# Patient Record
Sex: MALE | Race: WHITE | URBAN - METROPOLITAN AREA
[De-identification: names, ages, dates, MRNs, and addresses within clinical notes are randomized per-mention and may not be internally consistent; named-entity substitution may affect disease eponyms.]

---

## 2017-01-11 ENCOUNTER — HISTORICAL (OUTPATIENT)
Dept: ADMINISTRATIVE | Facility: HOSPITAL | Age: 82
End: 2017-01-11

## 2017-01-26 ENCOUNTER — HISTORICAL (OUTPATIENT)
Dept: ADMINISTRATIVE | Facility: HOSPITAL | Age: 82
End: 2017-01-26

## 2017-04-10 ENCOUNTER — HISTORICAL (OUTPATIENT)
Dept: ADMINISTRATIVE | Facility: HOSPITAL | Age: 82
End: 2017-04-10

## 2017-07-12 ENCOUNTER — HISTORICAL (OUTPATIENT)
Dept: LAB | Facility: HOSPITAL | Age: 82
End: 2017-07-12

## 2017-08-07 ENCOUNTER — HISTORICAL (OUTPATIENT)
Dept: ADMINISTRATIVE | Facility: HOSPITAL | Age: 82
End: 2017-08-07

## 2017-08-07 LAB
ALBUMIN SERPL-MCNC: 3.6 GM/DL (ref 3.4–5)
ALBUMIN/GLOB SERPL: 0.9 RATIO (ref 1.1–2)
ALP SERPL-CCNC: 55 UNIT/L (ref 50–136)
ALT SERPL-CCNC: 21 UNIT/L (ref 12–78)
AST SERPL-CCNC: 21 UNIT/L (ref 15–37)
BILIRUB SERPL-MCNC: 0.5 MG/DL (ref 0.2–1)
BILIRUBIN DIRECT+TOT PNL SERPL-MCNC: 0.1 MG/DL (ref 0–0.5)
BILIRUBIN DIRECT+TOT PNL SERPL-MCNC: 0.4 MG/DL (ref 0–0.8)
BUN SERPL-MCNC: 22 MG/DL (ref 7–18)
CALCIUM SERPL-MCNC: 8.8 MG/DL (ref 8.5–10.1)
CHLORIDE SERPL-SCNC: 105 MMOL/L (ref 98–107)
CHOLEST SERPL-MCNC: 160 MG/DL (ref 0–200)
CHOLEST/HDLC SERPL: 3.3 {RATIO} (ref 0–5)
CO2 SERPL-SCNC: 26 MMOL/L (ref 21–32)
CREAT SERPL-MCNC: 1.53 MG/DL (ref 0.7–1.3)
DEPRECATED CALCIDIOL+CALCIFEROL SERPL-MC: 48.4 NG/ML (ref 30–80)
ERYTHROCYTE [DISTWIDTH] IN BLOOD BY AUTOMATED COUNT: 12.8 % (ref 11.5–17)
GLOBULIN SER-MCNC: 3.9 GM/DL (ref 2.4–3.5)
GLUCOSE SERPL-MCNC: 108 MG/DL (ref 74–106)
HCT VFR BLD AUTO: 34.3 % (ref 42–52)
HDLC SERPL-MCNC: 49 MG/DL (ref 35–60)
HGB BLD-MCNC: 11 GM/DL (ref 14–18)
LDLC SERPL CALC-MCNC: 81 MG/DL (ref 0–129)
MCH RBC QN AUTO: 28.5 PG (ref 27–31)
MCHC RBC AUTO-ENTMCNC: 32.1 GM/DL (ref 33–36)
MCV RBC AUTO: 88.9 FL (ref 80–94)
PLATELET # BLD AUTO: 180 X10(3)/MCL (ref 130–400)
PMV BLD AUTO: 9.1 FL (ref 9.4–12.4)
POTASSIUM SERPL-SCNC: 4.8 MMOL/L (ref 3.5–5.1)
PROT SERPL-MCNC: 7.5 GM/DL (ref 6.4–8.2)
PSA SERPL-MCNC: 0.4 NG/ML (ref 0–4)
RBC # BLD AUTO: 3.86 X10(6)/MCL (ref 4.7–6.1)
SODIUM SERPL-SCNC: 141 MMOL/L (ref 136–145)
TRIGL SERPL-MCNC: 149 MG/DL (ref 30–150)
VLDLC SERPL CALC-MCNC: 30 MG/DL
WBC # SPEC AUTO: 6.6 X10(3)/MCL (ref 4.5–11.5)

## 2017-10-02 ENCOUNTER — HISTORICAL (OUTPATIENT)
Dept: ADMINISTRATIVE | Facility: HOSPITAL | Age: 82
End: 2017-10-02

## 2017-10-02 LAB
EST. AVERAGE GLUCOSE BLD GHB EST-MCNC: 131 MG/DL
HBA1C MFR BLD: 6.2 % (ref 4.2–6.3)
T4 SERPL-MCNC: 10.7 MCG/DL (ref 4.7–13.3)
TSH SERPL-ACNC: 1.02 MIU/ML (ref 0.36–3.74)

## 2017-12-21 ENCOUNTER — HISTORICAL (OUTPATIENT)
Dept: ADMINISTRATIVE | Facility: HOSPITAL | Age: 82
End: 2017-12-21

## 2017-12-21 LAB
FLUAV AG NPH QL IA: POSITIVE
FLUBV AG NPH QL IA: NEGATIVE

## 2018-01-08 ENCOUNTER — HISTORICAL (OUTPATIENT)
Dept: ADMINISTRATIVE | Facility: HOSPITAL | Age: 83
End: 2018-01-08

## 2018-01-08 LAB
EST. AVERAGE GLUCOSE BLD GHB EST-MCNC: 126 MG/DL
HBA1C MFR BLD: 6 % (ref 4.2–6.3)

## 2018-04-06 ENCOUNTER — HISTORICAL (OUTPATIENT)
Dept: ADMINISTRATIVE | Facility: HOSPITAL | Age: 83
End: 2018-04-06

## 2018-04-06 LAB
EST. AVERAGE GLUCOSE BLD GHB EST-MCNC: 131 MG/DL
HBA1C MFR BLD: 6.2 % (ref 4.2–6.3)
T4 SERPL-MCNC: 13 MCG/DL (ref 4.7–13.3)
TSH SERPL-ACNC: 3.6 MIU/ML (ref 0.36–3.74)

## 2018-07-02 ENCOUNTER — HISTORICAL (OUTPATIENT)
Dept: ADMINISTRATIVE | Facility: HOSPITAL | Age: 83
End: 2018-07-02

## 2018-07-02 LAB
EST. AVERAGE GLUCOSE BLD GHB EST-MCNC: 126 MG/DL
HBA1C MFR BLD: 6 % (ref 4.2–6.3)

## 2018-07-06 ENCOUNTER — HISTORICAL (OUTPATIENT)
Dept: ADMINISTRATIVE | Facility: HOSPITAL | Age: 83
End: 2018-07-06

## 2018-07-06 LAB
ALBUMIN SERPL-MCNC: 3.7 GM/DL (ref 3.4–5)
ALBUMIN/GLOB SERPL: 0.9 {RATIO}
ALP SERPL-CCNC: 52 UNIT/L (ref 50–136)
ALT SERPL-CCNC: 20 UNIT/L (ref 12–78)
AST SERPL-CCNC: 22 UNIT/L (ref 15–37)
BILIRUB SERPL-MCNC: 0.5 MG/DL (ref 0.2–1)
BILIRUBIN DIRECT+TOT PNL SERPL-MCNC: 0.2 MG/DL (ref 0–0.2)
BILIRUBIN DIRECT+TOT PNL SERPL-MCNC: 0.3 MG/DL (ref 0–0.8)
BUN SERPL-MCNC: 34 MG/DL (ref 7–18)
CALCIUM SERPL-MCNC: 8.6 MG/DL (ref 8.5–10.1)
CHLORIDE SERPL-SCNC: 102 MMOL/L (ref 98–107)
CHOLEST SERPL-MCNC: 143 MG/DL (ref 0–200)
CHOLEST/HDLC SERPL: 3.1 {RATIO} (ref 0–5)
CO2 SERPL-SCNC: 28 MMOL/L (ref 21–32)
CREAT SERPL-MCNC: 1.74 MG/DL (ref 0.7–1.3)
DEPRECATED CALCIDIOL+CALCIFEROL SERPL-MC: 51 NG/ML (ref 30–80)
ERYTHROCYTE [DISTWIDTH] IN BLOOD BY AUTOMATED COUNT: 13.2 % (ref 11.5–17)
GLOBULIN SER-MCNC: 3.9 GM/DL (ref 2.4–3.5)
GLUCOSE SERPL-MCNC: 93 MG/DL (ref 74–106)
HCT VFR BLD AUTO: 33.2 % (ref 42–52)
HDLC SERPL-MCNC: 46 MG/DL (ref 35–60)
HGB BLD-MCNC: 9.9 GM/DL (ref 14–18)
LDLC SERPL CALC-MCNC: 78 MG/DL (ref 0–129)
MCH RBC QN AUTO: 25.2 PG (ref 27–31)
MCHC RBC AUTO-ENTMCNC: 29.8 GM/DL (ref 33–36)
MCV RBC AUTO: 84.5 FL (ref 80–94)
PLATELET # BLD AUTO: 199 X10(3)/MCL (ref 130–400)
PMV BLD AUTO: 8.8 FL (ref 9.4–12.4)
POTASSIUM SERPL-SCNC: 4.9 MMOL/L (ref 3.5–5.1)
PROT SERPL-MCNC: 7.6 GM/DL (ref 6.4–8.2)
PSA SERPL-MCNC: 0.39 NG/ML (ref 0–4)
RBC # BLD AUTO: 3.93 X10(6)/MCL (ref 4.7–6.1)
SODIUM SERPL-SCNC: 138 MMOL/L (ref 136–145)
T4 SERPL-MCNC: 12 MCG/DL (ref 4.7–13.3)
TRIGL SERPL-MCNC: 94 MG/DL (ref 30–150)
TSH SERPL-ACNC: 2.5 MIU/L (ref 0.36–3.74)
VLDLC SERPL CALC-MCNC: 19 MG/DL
WBC # SPEC AUTO: 6.3 X10(3)/MCL (ref 4.5–11.5)

## 2018-08-31 ENCOUNTER — HISTORICAL (OUTPATIENT)
Dept: ADMINISTRATIVE | Facility: HOSPITAL | Age: 83
End: 2018-08-31

## 2018-08-31 LAB
ABS NEUT (OLG): 3.7 X10(3)/MCL (ref 2.1–9.2)
BASOPHILS # BLD AUTO: 0 X10(3)/MCL (ref 0–0.2)
BASOPHILS NFR BLD AUTO: 1 %
BUN SERPL-MCNC: 28 MG/DL (ref 7–18)
CALCIUM SERPL-MCNC: 8.6 MG/DL (ref 8.5–10.1)
CHLORIDE SERPL-SCNC: 104 MMOL/L (ref 98–107)
CO2 SERPL-SCNC: 26 MMOL/L (ref 21–32)
CREAT SERPL-MCNC: 1.79 MG/DL (ref 0.7–1.3)
CREAT/UREA NIT SERPL: 15.6
EOSINOPHIL # BLD AUTO: 1.3 X10(3)/MCL (ref 0–0.9)
EOSINOPHIL NFR BLD AUTO: 19 %
ERYTHROCYTE [DISTWIDTH] IN BLOOD BY AUTOMATED COUNT: 13.6 % (ref 11.5–17)
GLUCOSE SERPL-MCNC: 148 MG/DL (ref 74–106)
HCT VFR BLD AUTO: 32.1 % (ref 42–52)
HGB BLD-MCNC: 9.6 GM/DL (ref 14–18)
LYMPHOCYTES # BLD AUTO: 1.4 X10(3)/MCL (ref 0.6–4.6)
LYMPHOCYTES NFR BLD AUTO: 20 %
MCH RBC QN AUTO: 25.5 PG (ref 27–31)
MCHC RBC AUTO-ENTMCNC: 29.9 GM/DL (ref 33–36)
MCV RBC AUTO: 85.1 FL (ref 80–94)
MONOCYTES # BLD AUTO: 0.6 X10(3)/MCL (ref 0.1–1.3)
MONOCYTES NFR BLD AUTO: 8 %
NEUTROPHILS # BLD AUTO: 3.7 X10(3)/MCL (ref 1.4–7.9)
NEUTROPHILS NFR BLD AUTO: 53 %
PLATELET # BLD AUTO: 213 X10(3)/MCL (ref 130–400)
PMV BLD AUTO: 8.8 FL (ref 9.4–12.4)
POTASSIUM SERPL-SCNC: 4.5 MMOL/L (ref 3.5–5.1)
RBC # BLD AUTO: 3.77 X10(6)/MCL (ref 4.7–6.1)
SODIUM SERPL-SCNC: 137 MMOL/L (ref 136–145)
WBC # SPEC AUTO: 7 X10(3)/MCL (ref 4.5–11.5)

## 2018-09-14 ENCOUNTER — HISTORICAL (OUTPATIENT)
Dept: ADMINISTRATIVE | Facility: HOSPITAL | Age: 83
End: 2018-09-14

## 2018-09-14 LAB
BUN SERPL-MCNC: 31 MG/DL (ref 7–18)
CALCIUM SERPL-MCNC: 8.6 MG/DL (ref 8.5–10.1)
CHLORIDE SERPL-SCNC: 102 MMOL/L (ref 98–107)
CO2 SERPL-SCNC: 29 MMOL/L (ref 21–32)
CREAT SERPL-MCNC: 1.92 MG/DL (ref 0.7–1.3)
CREAT/UREA NIT SERPL: 16.1
ERYTHROCYTE [DISTWIDTH] IN BLOOD BY AUTOMATED COUNT: 13.5 % (ref 11.5–17)
GLUCOSE SERPL-MCNC: 143 MG/DL (ref 74–106)
HCT VFR BLD AUTO: 32.8 % (ref 42–52)
HGB BLD-MCNC: 9.8 GM/DL (ref 14–18)
MCH RBC QN AUTO: 25.5 PG (ref 27–31)
MCHC RBC AUTO-ENTMCNC: 29.9 GM/DL (ref 33–36)
MCV RBC AUTO: 85.4 FL (ref 80–94)
PLATELET # BLD AUTO: 218 X10(3)/MCL (ref 130–400)
PMV BLD AUTO: 8.9 FL (ref 9.4–12.4)
POTASSIUM SERPL-SCNC: 4.8 MMOL/L (ref 3.5–5.1)
RBC # BLD AUTO: 3.84 X10(6)/MCL (ref 4.7–6.1)
SODIUM SERPL-SCNC: 138 MMOL/L (ref 136–145)
WBC # SPEC AUTO: 5.3 X10(3)/MCL (ref 4.5–11.5)

## 2018-10-01 ENCOUNTER — HISTORICAL (OUTPATIENT)
Dept: ADMINISTRATIVE | Facility: HOSPITAL | Age: 83
End: 2018-10-01

## 2018-10-01 LAB
EST. AVERAGE GLUCOSE BLD GHB EST-MCNC: 140 MG/DL
HBA1C MFR BLD: 6.5 % (ref 4.2–6.3)

## 2018-10-29 ENCOUNTER — HISTORICAL (OUTPATIENT)
Dept: ADMINISTRATIVE | Facility: HOSPITAL | Age: 83
End: 2018-10-29

## 2018-11-15 ENCOUNTER — HISTORICAL (OUTPATIENT)
Dept: ADMINISTRATIVE | Facility: HOSPITAL | Age: 83
End: 2018-11-15

## 2018-11-15 LAB
ABS NEUT (OLG): 5.15 X10(3)/MCL (ref 2.1–9.2)
ALBUMIN SERPL-MCNC: 3.6 GM/DL (ref 3.4–5)
ALBUMIN/GLOB SERPL: 1 {RATIO}
ALP SERPL-CCNC: 63 UNIT/L (ref 45–117)
ALT SERPL-CCNC: 21 UNIT/L (ref 16–61)
AST SERPL-CCNC: 26 UNIT/L (ref 15–37)
BASOPHILS # BLD AUTO: 0.03 X10(3)/MCL (ref 0–0.2)
BASOPHILS NFR BLD AUTO: 0.4 % (ref 0–0.9)
BILIRUB SERPL-MCNC: 0.2 MG/DL (ref 0.2–1)
BILIRUBIN DIRECT+TOT PNL SERPL-MCNC: <0.1 MG/DL (ref 0–0.2)
BILIRUBIN DIRECT+TOT PNL SERPL-MCNC: >0.1 MG/DL (ref 0–1)
BUN SERPL-MCNC: 36 MG/DL (ref 7–18)
CALCIUM SERPL-MCNC: 8.6 MG/DL (ref 8.5–10.1)
CHLORIDE SERPL-SCNC: 105 MMOL/L (ref 98–107)
CO2 SERPL-SCNC: 27 MMOL/L (ref 21–32)
CREAT SERPL-MCNC: 1.79 MG/DL (ref 0.7–1.3)
EOSINOPHIL # BLD AUTO: 0.65 X10(3)/MCL (ref 0–0.9)
EOSINOPHIL NFR BLD AUTO: 8.8 % (ref 0–6.5)
ERYTHROCYTE [DISTWIDTH] IN BLOOD BY AUTOMATED COUNT: 13.4 % (ref 11.5–17)
GLOBULIN SER-MCNC: 4 GM/DL (ref 2–4)
GLUCOSE SERPL-MCNC: 114 MG/DL (ref 74–106)
HCT VFR BLD AUTO: 31.2 % (ref 42–52)
HGB BLD-MCNC: 9.7 GM/DL (ref 14–18)
IMM GRANULOCYTES # BLD AUTO: 0.03 10*3/UL (ref 0–0.02)
IMM GRANULOCYTES NFR BLD AUTO: 0.4 % (ref 0–0.43)
LYMPHOCYTES # BLD AUTO: 1.03 X10(3)/MCL (ref 0.6–4.6)
LYMPHOCYTES NFR BLD AUTO: 14 % (ref 16.2–38.3)
MCH RBC QN AUTO: 26.1 PG (ref 27–31)
MCHC RBC AUTO-ENTMCNC: 31.1 GM/DL (ref 33–36)
MCV RBC AUTO: 84.1 FL (ref 80–94)
MONOCYTES # BLD AUTO: 0.49 X10(3)/MCL (ref 0.1–1.3)
MONOCYTES NFR BLD AUTO: 6.6 % (ref 4.7–11.3)
NEUTROPHILS # BLD AUTO: 5.15 X10(3)/MCL (ref 2.1–9.2)
NEUTROPHILS NFR BLD AUTO: 69.8 % (ref 49.1–73.4)
NRBC BLD AUTO-RTO: 0 % (ref 0–0.2)
PLATELET # BLD AUTO: 188 X10(3)/MCL (ref 130–400)
PMV BLD AUTO: 9.4 FL (ref 7.4–10.4)
POTASSIUM SERPL-SCNC: 5.1 MMOL/L (ref 3.5–5.1)
PROT SERPL-MCNC: 8 GM/DL (ref 6.4–8.2)
RBC # BLD AUTO: 3.71 X10(6)/MCL (ref 4.7–6.1)
SODIUM SERPL-SCNC: 139 MMOL/L (ref 136–145)
WBC # SPEC AUTO: 7.4 X10(3)/MCL (ref 4.5–11.5)

## 2019-01-04 ENCOUNTER — HISTORICAL (OUTPATIENT)
Dept: ADMINISTRATIVE | Facility: HOSPITAL | Age: 84
End: 2019-01-04

## 2019-01-04 LAB
EST. AVERAGE GLUCOSE BLD GHB EST-MCNC: 128 MG/DL
HBA1C MFR BLD: 6.1 % (ref 4.2–6.3)
T4 SERPL-MCNC: 6.9 MCG/DL (ref 4.7–13.3)
TSH SERPL-ACNC: 2.77 MIU/ML (ref 0.36–3.74)

## 2019-04-03 ENCOUNTER — HISTORICAL (OUTPATIENT)
Dept: ADMINISTRATIVE | Facility: HOSPITAL | Age: 84
End: 2019-04-03

## 2019-04-03 LAB
EST. AVERAGE GLUCOSE BLD GHB EST-MCNC: 128 MG/DL
HBA1C MFR BLD: 6.1 % (ref 4.2–6.3)

## 2019-07-01 ENCOUNTER — HISTORICAL (OUTPATIENT)
Dept: ADMINISTRATIVE | Facility: HOSPITAL | Age: 84
End: 2019-07-01

## 2019-07-01 LAB
ABS NEUT (OLG): 2.39 X10(3)/MCL (ref 2.1–9.2)
ALBUMIN SERPL-MCNC: 3.3 GM/DL (ref 3.4–5)
ALBUMIN/GLOB SERPL: 0.8 RATIO (ref 1–2)
ALP SERPL-CCNC: 53 UNIT/L (ref 45–117)
ALT SERPL-CCNC: 16 UNIT/L (ref 16–61)
AST SERPL-CCNC: 16 UNIT/L (ref 15–37)
BASOPHILS NFR BLD MANUAL: 0 %
BILIRUB SERPL-MCNC: 0.2 MG/DL (ref 0.2–1)
BILIRUBIN DIRECT+TOT PNL SERPL-MCNC: <0.1 MG/DL (ref 0–0.2)
BILIRUBIN DIRECT+TOT PNL SERPL-MCNC: >0.1 MG/DL (ref 0–1)
BUN SERPL-MCNC: 34 MG/DL (ref 7–18)
CALCIUM SERPL-MCNC: 8.3 MG/DL (ref 8.5–10.1)
CHLORIDE SERPL-SCNC: 107 MMOL/L (ref 98–107)
CHOLEST SERPL-MCNC: 148 MG/DL (ref 0–199)
CHOLEST/HDLC SERPL: 3 MG/DL (ref 0–8)
CO2 SERPL-SCNC: 27 MMOL/L (ref 21–32)
CREAT SERPL-MCNC: 1.44 MG/DL (ref 0.7–1.3)
DEPRECATED CALCIDIOL+CALCIFEROL SERPL-MC: 30.11 NG/ML (ref 30–80)
EOSINOPHIL NFR BLD MANUAL: 16 % (ref 0–5)
ERYTHROCYTE [DISTWIDTH] IN BLOOD BY AUTOMATED COUNT: 13.8 % (ref 11.5–17)
EST. AVERAGE GLUCOSE BLD GHB EST-MCNC: 143 MG/DL
GLOBULIN SER-MCNC: 4 GM/DL (ref 2–4)
GLUCOSE SERPL-MCNC: 82 MG/DL (ref 74–106)
GRANULOCYTES NFR BLD MANUAL: 55 %
HBA1C MFR BLD: 6.6 % (ref 4.2–6.3)
HCT VFR BLD AUTO: 29.2 % (ref 42–52)
HDLC SERPL-MCNC: 44 MG/DL
HGB BLD-MCNC: 9.3 GM/DL (ref 14–18)
HYPOCHROMIA BLD QL SMEAR: ABNORMAL
LDLC SERPL CALC-MCNC: 86 MG/DL (ref 0–129)
LYMPHOCYTES NFR BLD MANUAL: 22 % (ref 24–44)
MCH RBC QN AUTO: 26.6 PG (ref 27–31)
MCHC RBC AUTO-ENTMCNC: 31.8 GM/DL (ref 33–36)
MCV RBC AUTO: 83.4 FL (ref 80–94)
MONOCYTES NFR BLD MANUAL: 7 % (ref 4–8)
PLATELET # BLD AUTO: 177 X10(3)/MCL (ref 130–400)
PLATELET # BLD EST: ADEQUATE 10*3/UL
PMV BLD AUTO: 9.2 FL (ref 7.4–10.4)
POTASSIUM SERPL-SCNC: 4.7 MMOL/L (ref 3.5–5.1)
PROT SERPL-MCNC: 6.8 GM/DL (ref 6.4–8.2)
PSA SERPL-MCNC: 0.3 NG/ML (ref 0–4)
RBC # BLD AUTO: 3.5 X10(6)/MCL (ref 4.7–6.1)
RBC MORPH BLD: ABNORMAL
SODIUM SERPL-SCNC: 139 MMOL/L (ref 136–145)
T4 SERPL-MCNC: 6.2 MCG/DL (ref 4.7–13.3)
TRIGL SERPL-MCNC: 92 MG/DL (ref 0–149)
TSH SERPL-ACNC: 2.1 MIU/ML (ref 0.36–3.74)
VLDLC SERPL CALC-MCNC: 18 MG/DL
WBC # SPEC AUTO: 4.6 X10(3)/MCL (ref 4.5–11.5)

## 2019-08-07 ENCOUNTER — HOSPITAL ENCOUNTER (OUTPATIENT)
Dept: MEDSURG UNIT | Facility: HOSPITAL | Age: 84
End: 2019-08-09
Attending: INTERNAL MEDICINE | Admitting: INTERNAL MEDICINE

## 2019-08-07 LAB
EST. AVERAGE GLUCOSE BLD GHB EST-MCNC: 131 MG/DL
HBA1C MFR BLD: 6.2 % (ref 4.2–6.3)

## 2019-08-08 LAB
ABS NEUT (OLG): 2.62 X10(3)/MCL (ref 2.1–9.2)
ALBUMIN SERPL-MCNC: 3.2 GM/DL (ref 3.4–5)
ALBUMIN/GLOB SERPL: 0.9 RATIO (ref 1.1–2)
ALP SERPL-CCNC: 60 UNIT/L (ref 45–117)
ALT SERPL-CCNC: 14 UNIT/L (ref 12–78)
AST SERPL-CCNC: 16 UNIT/L (ref 15–37)
BASOPHILS # BLD AUTO: 0.02 X10(3)/MCL
BASOPHILS NFR BLD AUTO: 0 %
BILIRUB SERPL-MCNC: 0.3 MG/DL (ref 0.2–1)
BILIRUBIN DIRECT+TOT PNL SERPL-MCNC: 0.1 MG/DL
BILIRUBIN DIRECT+TOT PNL SERPL-MCNC: 0.2 MG/DL
BUN SERPL-MCNC: 32 MG/DL (ref 7–18)
CALCIUM SERPL-MCNC: 8.5 MG/DL (ref 8.5–10.1)
CHLORIDE SERPL-SCNC: 109 MMOL/L (ref 98–107)
CO2 SERPL-SCNC: 29 MMOL/L (ref 21–32)
CREAT SERPL-MCNC: 1.6 MG/DL (ref 0.6–1.3)
EOSINOPHIL # BLD AUTO: 0.73 X10(3)/MCL
EOSINOPHIL NFR BLD AUTO: 15 %
ERYTHROCYTE [DISTWIDTH] IN BLOOD BY AUTOMATED COUNT: 13.4 % (ref 11.5–14.5)
GLOBULIN SER-MCNC: 3.6 GM/ML (ref 2.3–3.5)
GLUCOSE SERPL-MCNC: 85 MG/DL (ref 74–106)
HCT VFR BLD AUTO: 30.2 % (ref 40–51)
HGB BLD-MCNC: 9.3 GM/DL (ref 13.5–17.5)
LYMPHOCYTES # BLD AUTO: 0.99 X10(3)/MCL
LYMPHOCYTES NFR BLD AUTO: 21 % (ref 13–40)
MCH RBC QN AUTO: 26.4 PG (ref 26–34)
MCHC RBC AUTO-ENTMCNC: 30.8 GM/DL (ref 31–37)
MCV RBC AUTO: 85.8 FL (ref 80–100)
MONOCYTES # BLD AUTO: 0.42 X10(3)/MCL
MONOCYTES NFR BLD AUTO: 9 % (ref 0–24)
NEUTROPHILS # BLD AUTO: 2.62 X10(3)/MCL
NEUTROPHILS NFR BLD AUTO: 55 X10(3)/MCL
PLATELET # BLD AUTO: 169 X10(3)/MCL (ref 130–400)
PMV BLD AUTO: 9.1 FL (ref 7.4–10.4)
POTASSIUM SERPL-SCNC: 4.5 MMOL/L (ref 3.5–5.1)
PROT SERPL-MCNC: 6.8 GM/DL (ref 6.4–8.2)
RBC # BLD AUTO: 3.52 X10(6)/MCL (ref 4.5–5.9)
SODIUM SERPL-SCNC: 142 MMOL/L (ref 136–145)
WBC # SPEC AUTO: 4.8 X10(3)/MCL (ref 4.5–11)

## 2019-09-05 LAB — FINAL CULTURE: NORMAL

## 2019-09-30 ENCOUNTER — HOSPITAL ENCOUNTER (OUTPATIENT)
Dept: EMERGENCY MEDICINE | Facility: HOSPITAL | Age: 84
End: 2019-10-01
Attending: INTERNAL MEDICINE | Admitting: INTERNAL MEDICINE

## 2019-09-30 ENCOUNTER — HISTORICAL (OUTPATIENT)
Dept: ADMINISTRATIVE | Facility: HOSPITAL | Age: 84
End: 2019-09-30

## 2019-09-30 LAB
ABS NEUT (OLG): 2.6 X10(3)/MCL (ref 2.1–9.2)
ABS NEUT (OLG): 3.31 X10(3)/MCL (ref 2.1–9.2)
ALBUMIN SERPL-MCNC: 3.4 GM/DL (ref 3.4–5)
ALBUMIN SERPL-MCNC: 3.7 GM/DL (ref 3.4–5)
ALBUMIN/GLOB SERPL: 0.8 RATIO (ref 1.1–2)
ALBUMIN/GLOB SERPL: 0.9 RATIO (ref 1.1–2)
ALP SERPL-CCNC: 59 UNIT/L (ref 45–117)
ALP SERPL-CCNC: 61 UNIT/L (ref 45–117)
ALT SERPL-CCNC: 17 UNIT/L (ref 12–78)
ALT SERPL-CCNC: 20 UNIT/L (ref 12–78)
AMYLASE SERPL-CCNC: 99 UNIT/L (ref 25–115)
APPEARANCE, UA: CLEAR
AST SERPL-CCNC: 16 UNIT/L (ref 15–37)
AST SERPL-CCNC: 18 UNIT/L (ref 15–37)
BACTERIA #/AREA URNS AUTO: ABNORMAL /[HPF]
BASOPHILS # BLD AUTO: 0 X10(3)/MCL (ref 0–0.2)
BASOPHILS # BLD AUTO: 0 X10(3)/MCL (ref 0–0.2)
BASOPHILS NFR BLD AUTO: 0 %
BASOPHILS NFR BLD AUTO: 1 %
BILIRUB SERPL-MCNC: 0.2 MG/DL (ref 0.2–1)
BILIRUB SERPL-MCNC: 0.4 MG/DL (ref 0.2–1)
BILIRUB UR QL STRIP: NEGATIVE
BILIRUBIN DIRECT+TOT PNL SERPL-MCNC: 0.1 MG/DL (ref 0–0.2)
BILIRUBIN DIRECT+TOT PNL SERPL-MCNC: 0.3 MG/DL
BILIRUBIN DIRECT+TOT PNL SERPL-MCNC: <0.1 MG/DL (ref 0–0.2)
BILIRUBIN DIRECT+TOT PNL SERPL-MCNC: ABNORMAL MG/DL
BUN SERPL-MCNC: 40 MG/DL (ref 7–18)
BUN SERPL-MCNC: 45 MG/DL (ref 7–18)
CALCIUM SERPL-MCNC: 8.5 MG/DL (ref 8.5–10.1)
CALCIUM SERPL-MCNC: 9 MG/DL (ref 8.5–10.1)
CHLORIDE SERPL-SCNC: 109 MMOL/L (ref 98–107)
CHLORIDE SERPL-SCNC: 110 MMOL/L (ref 98–107)
CO2 SERPL-SCNC: 26 MMOL/L (ref 21–32)
CO2 SERPL-SCNC: 28 MMOL/L (ref 21–32)
COLOR UR: YELLOW
CREAT SERPL-MCNC: 2.5 MG/DL (ref 0.6–1.3)
CREAT SERPL-MCNC: 2.5 MG/DL (ref 0.6–1.3)
CREAT UR-MCNC: 353 MG/DL
EOSINOPHIL # BLD AUTO: 0.7 X10(3)/MCL (ref 0–0.9)
EOSINOPHIL # BLD AUTO: 0.9 X10(3)/MCL (ref 0–0.9)
EOSINOPHIL NFR BLD AUTO: 14 %
EOSINOPHIL NFR BLD AUTO: 16 %
ERYTHROCYTE [DISTWIDTH] IN BLOOD BY AUTOMATED COUNT: 13.6 % (ref 11.5–14.5)
ERYTHROCYTE [DISTWIDTH] IN BLOOD BY AUTOMATED COUNT: 13.7 % (ref 11.5–14.5)
FESODIUM % (OHS): 1 %
GLOBULIN SER-MCNC: 4.1 GM/ML (ref 2.3–3.5)
GLOBULIN SER-MCNC: 4.2 GM/ML (ref 2.3–3.5)
GLUCOSE (UA): NORMAL
GLUCOSE SERPL-MCNC: 103 MG/DL (ref 74–106)
GLUCOSE SERPL-MCNC: 118 MG/DL (ref 74–106)
HCT VFR BLD AUTO: 30.6 % (ref 40–51)
HCT VFR BLD AUTO: 34.3 % (ref 40–51)
HGB BLD-MCNC: 10.5 GM/DL (ref 13.5–17.5)
HGB BLD-MCNC: 9.4 GM/DL (ref 13.5–17.5)
HGB UR QL STRIP: NEGATIVE
HYALINE CASTS #/AREA URNS LPF: ABNORMAL /[LPF]
IMM GRANULOCYTES # BLD AUTO: 0.01 10*3/UL
IMM GRANULOCYTES # BLD AUTO: 0.03 10*3/UL
IMM GRANULOCYTES NFR BLD AUTO: 0 %
IMM GRANULOCYTES NFR BLD AUTO: 1 %
KETONES UR QL STRIP: ABNORMAL
LACTATE SERPL-SCNC: 0.9 MMOL/L (ref 0.4–2)
LEUKOCYTE ESTERASE UR QL STRIP: NEGATIVE
LIPASE SERPL-CCNC: 224 UNIT/L (ref 73–393)
LYMPHOCYTES # BLD AUTO: 1 X10(3)/MCL (ref 0.6–4.6)
LYMPHOCYTES # BLD AUTO: 1.1 X10(3)/MCL (ref 0.6–4.6)
LYMPHOCYTES NFR BLD AUTO: 17 %
LYMPHOCYTES NFR BLD AUTO: 22 %
MCH RBC QN AUTO: 27.2 PG (ref 26–34)
MCH RBC QN AUTO: 27.3 PG (ref 26–34)
MCHC RBC AUTO-ENTMCNC: 30.6 GM/DL (ref 31–37)
MCHC RBC AUTO-ENTMCNC: 30.7 GM/DL (ref 31–37)
MCV RBC AUTO: 88.9 FL (ref 80–100)
MCV RBC AUTO: 89 FL (ref 80–100)
MONOCYTES # BLD AUTO: 0.4 X10(3)/MCL (ref 0.1–1.3)
MONOCYTES # BLD AUTO: 0.6 X10(3)/MCL (ref 0.1–1.3)
MONOCYTES NFR BLD AUTO: 10 %
MONOCYTES NFR BLD AUTO: 9 %
NEUTROPHILS # BLD AUTO: 2.6 X10(3)/MCL (ref 2.1–9.2)
NEUTROPHILS # BLD AUTO: 3.31 X10(3)/MCL (ref 2.1–9.2)
NEUTROPHILS NFR BLD AUTO: 54 %
NEUTROPHILS NFR BLD AUTO: 56 %
NITRITE UR QL STRIP: NEGATIVE
PH UR STRIP: 5.5 [PH] (ref 4.5–8)
PLATELET # BLD AUTO: 200 X10(3)/MCL (ref 130–400)
PLATELET # BLD AUTO: 265 X10(3)/MCL (ref 130–400)
PMV BLD AUTO: 9.4 FL (ref 7.4–10.4)
PMV BLD AUTO: 9.5 FL (ref 7.4–10.4)
POTASSIUM SERPL-SCNC: 5 MMOL/L (ref 3.5–5.1)
POTASSIUM SERPL-SCNC: 5.5 MMOL/L (ref 3.5–5.1)
PROT SERPL-MCNC: 7.5 GM/DL (ref 6.4–8.2)
PROT SERPL-MCNC: 7.9 GM/DL (ref 6.4–8.2)
PROT UR QL STRIP: 30 MG/DL
RBC # BLD AUTO: 3.44 X10(6)/MCL (ref 4.5–5.9)
RBC # BLD AUTO: 3.86 X10(6)/MCL (ref 4.5–5.9)
RBC #/AREA URNS AUTO: ABNORMAL /[HPF]
SODIUM SERPL-SCNC: 141 MMOL/L (ref 136–145)
SODIUM SERPL-SCNC: 141 MMOL/L (ref 136–145)
SODIUM UR-SCNC: 102 MMOL/L
SP GR UR STRIP: 1.03 (ref 1–1.03)
SQUAMOUS #/AREA URNS LPF: >100 /[LPF]
TROPONIN I SERPL-MCNC: <0.015 NG/ML (ref 0–0.05)
TSH SERPL-ACNC: 2.9 MIU/L (ref 0.36–3.74)
UROBILINOGEN UR STRIP-ACNC: 2 MG/DL
WBC # SPEC AUTO: 4.8 X10(3)/MCL (ref 4.5–11)
WBC # SPEC AUTO: 5.8 X10(3)/MCL (ref 4.5–11)
WBC #/AREA URNS AUTO: ABNORMAL /HPF

## 2019-10-01 LAB
ABS NEUT (OLG): 2.84 X10(3)/MCL (ref 2.1–9.2)
ALBUMIN SERPL-MCNC: 3 GM/DL (ref 3.4–5)
ALBUMIN/GLOB SERPL: 0.8 RATIO (ref 1.1–2)
ALP SERPL-CCNC: 58 UNIT/L (ref 45–117)
ALT SERPL-CCNC: 19 UNIT/L (ref 12–78)
AST SERPL-CCNC: 19 UNIT/L (ref 15–37)
BASOPHILS # BLD AUTO: 0 X10(3)/MCL (ref 0–0.2)
BASOPHILS NFR BLD AUTO: 0 %
BILIRUB SERPL-MCNC: 0.3 MG/DL (ref 0.2–1)
BILIRUBIN DIRECT+TOT PNL SERPL-MCNC: 0.1 MG/DL (ref 0–0.2)
BILIRUBIN DIRECT+TOT PNL SERPL-MCNC: 0.2 MG/DL
BUN SERPL-MCNC: 39 MG/DL (ref 7–18)
CALCIUM SERPL-MCNC: 8.3 MG/DL (ref 8.5–10.1)
CHLORIDE SERPL-SCNC: 110 MMOL/L (ref 98–107)
CO2 SERPL-SCNC: 24 MMOL/L (ref 21–32)
CREAT SERPL-MCNC: 1.7 MG/DL (ref 0.6–1.3)
EOSINOPHIL # BLD AUTO: 0.6 X10(3)/MCL (ref 0–0.9)
EOSINOPHIL NFR BLD AUTO: 13 %
ERYTHROCYTE [DISTWIDTH] IN BLOOD BY AUTOMATED COUNT: 13.3 % (ref 11.5–14.5)
GLOBULIN SER-MCNC: 3.9 GM/ML (ref 2.3–3.5)
GLUCOSE SERPL-MCNC: 92 MG/DL (ref 74–106)
HCT VFR BLD AUTO: 31 % (ref 40–51)
HGB BLD-MCNC: 9.8 GM/DL (ref 13.5–17.5)
IMM GRANULOCYTES # BLD AUTO: 0.01 10*3/UL
IMM GRANULOCYTES NFR BLD AUTO: 0 %
LYMPHOCYTES # BLD AUTO: 1 X10(3)/MCL (ref 0.6–4.6)
LYMPHOCYTES NFR BLD AUTO: 20 %
MAGNESIUM SERPL-MCNC: 1.9 MG/DL (ref 1.6–2.6)
MCH RBC QN AUTO: 27.3 PG (ref 26–34)
MCHC RBC AUTO-ENTMCNC: 31.6 GM/DL (ref 31–37)
MCV RBC AUTO: 86.4 FL (ref 80–100)
MONOCYTES # BLD AUTO: 0.5 X10(3)/MCL (ref 0.1–1.3)
MONOCYTES NFR BLD AUTO: 10 %
NEUTROPHILS # BLD AUTO: 2.84 X10(3)/MCL (ref 2.1–9.2)
NEUTROPHILS NFR BLD AUTO: 57 %
PHOSPHATE SERPL-MCNC: 2.8 MG/DL (ref 2.5–4.9)
PLATELET # BLD AUTO: 182 X10(3)/MCL (ref 130–400)
PMV BLD AUTO: 8.9 FL (ref 7.4–10.4)
POTASSIUM SERPL-SCNC: 4.6 MMOL/L (ref 3.5–5.1)
PROT SERPL-MCNC: 6.9 GM/DL (ref 6.4–8.2)
RBC # BLD AUTO: 3.59 X10(6)/MCL (ref 4.5–5.9)
SODIUM SERPL-SCNC: 140 MMOL/L (ref 136–145)
WBC # SPEC AUTO: 5 X10(3)/MCL (ref 4.5–11)

## 2019-10-02 ENCOUNTER — HISTORICAL (OUTPATIENT)
Dept: INFUSION THERAPY | Facility: HOSPITAL | Age: 84
End: 2019-10-02

## 2019-10-03 ENCOUNTER — HISTORICAL (OUTPATIENT)
Dept: INFUSION THERAPY | Facility: HOSPITAL | Age: 84
End: 2019-10-03

## 2019-10-03 LAB
ABS NEUT (OLG): 2.75 X10(3)/MCL (ref 2.1–9.2)
ALBUMIN SERPL-MCNC: 3.2 GM/DL (ref 3.4–5)
ALBUMIN/GLOB SERPL: 0.8 RATIO (ref 1.1–2)
ALP SERPL-CCNC: 61 UNIT/L (ref 45–117)
ALT SERPL-CCNC: 18 UNIT/L (ref 12–78)
AST SERPL-CCNC: 13 UNIT/L (ref 15–37)
BASOPHILS # BLD AUTO: 0 X10(3)/MCL (ref 0–0.2)
BASOPHILS NFR BLD AUTO: 0 %
BILIRUB SERPL-MCNC: 0.2 MG/DL (ref 0.2–1)
BILIRUBIN DIRECT+TOT PNL SERPL-MCNC: <0.1 MG/DL (ref 0–0.2)
BILIRUBIN DIRECT+TOT PNL SERPL-MCNC: ABNORMAL MG/DL
BUN SERPL-MCNC: 34 MG/DL (ref 7–18)
CALCIUM SERPL-MCNC: 8.2 MG/DL (ref 8.5–10.1)
CHLORIDE SERPL-SCNC: 109 MMOL/L (ref 98–107)
CO2 SERPL-SCNC: 28 MMOL/L (ref 21–32)
CREAT SERPL-MCNC: 1.9 MG/DL (ref 0.6–1.3)
EOSINOPHIL # BLD AUTO: 0.8 X10(3)/MCL (ref 0–0.9)
EOSINOPHIL NFR BLD AUTO: 16 %
ERYTHROCYTE [DISTWIDTH] IN BLOOD BY AUTOMATED COUNT: 13.9 % (ref 11.5–14.5)
GLOBULIN SER-MCNC: 4 GM/ML (ref 2.3–3.5)
GLUCOSE SERPL-MCNC: 87 MG/DL (ref 74–106)
HCT VFR BLD AUTO: 30.4 % (ref 40–51)
HGB BLD-MCNC: 9.5 GM/DL (ref 13.5–17.5)
IMM GRANULOCYTES # BLD AUTO: 0.01 10*3/UL
IMM GRANULOCYTES NFR BLD AUTO: 0 %
LYMPHOCYTES # BLD AUTO: 1 X10(3)/MCL (ref 0.6–4.6)
LYMPHOCYTES NFR BLD AUTO: 20 %
MCH RBC QN AUTO: 27 PG (ref 26–34)
MCHC RBC AUTO-ENTMCNC: 31.3 GM/DL (ref 31–37)
MCV RBC AUTO: 86.4 FL (ref 80–100)
MONOCYTES # BLD AUTO: 0.5 X10(3)/MCL (ref 0.1–1.3)
MONOCYTES NFR BLD AUTO: 10 %
NEUTROPHILS # BLD AUTO: 2.75 X10(3)/MCL (ref 2.1–9.2)
NEUTROPHILS NFR BLD AUTO: 54 %
PLATELET # BLD AUTO: 210 X10(3)/MCL (ref 130–400)
PMV BLD AUTO: 9.1 FL (ref 7.4–10.4)
POTASSIUM SERPL-SCNC: 4.7 MMOL/L (ref 3.5–5.1)
PROT SERPL-MCNC: 7.2 GM/DL (ref 6.4–8.2)
RBC # BLD AUTO: 3.52 X10(6)/MCL (ref 4.5–5.9)
SODIUM SERPL-SCNC: 141 MMOL/L (ref 136–145)
WBC # SPEC AUTO: 5.1 X10(3)/MCL (ref 4.5–11)

## 2019-10-04 ENCOUNTER — HISTORICAL (OUTPATIENT)
Dept: INFUSION THERAPY | Facility: HOSPITAL | Age: 84
End: 2019-10-04

## 2019-10-05 ENCOUNTER — HISTORICAL (OUTPATIENT)
Dept: INFUSION THERAPY | Facility: HOSPITAL | Age: 84
End: 2019-10-05

## 2019-10-06 ENCOUNTER — HISTORICAL (OUTPATIENT)
Dept: INFUSION THERAPY | Facility: HOSPITAL | Age: 84
End: 2019-10-06

## 2019-10-06 LAB
FINAL CULTURE: NORMAL
FINAL CULTURE: NORMAL

## 2019-10-07 ENCOUNTER — HISTORICAL (OUTPATIENT)
Dept: INFUSION THERAPY | Facility: HOSPITAL | Age: 84
End: 2019-10-07

## 2019-10-07 ENCOUNTER — HISTORICAL (OUTPATIENT)
Dept: ADMINISTRATIVE | Facility: HOSPITAL | Age: 84
End: 2019-10-07

## 2019-10-07 LAB
ALBUMIN SERPL-MCNC: 3.1 GM/DL (ref 3.4–5)
ALBUMIN/GLOB SERPL: 0.8 {RATIO}
ALP SERPL-CCNC: 57 UNIT/L (ref 45–117)
ALT SERPL-CCNC: 14 UNIT/L (ref 16–61)
AST SERPL-CCNC: 21 UNIT/L (ref 15–37)
BILIRUB SERPL-MCNC: 0.3 MG/DL (ref 0.2–1)
BILIRUBIN DIRECT+TOT PNL SERPL-MCNC: <0.1 MG/DL (ref 0–0.2)
BILIRUBIN DIRECT+TOT PNL SERPL-MCNC: >0.2 MG/DL (ref 0–1)
BUN SERPL-MCNC: 29 MG/DL (ref 7–18)
CALCIUM SERPL-MCNC: 8.5 MG/DL (ref 8.5–10.1)
CHLORIDE SERPL-SCNC: 106 MMOL/L (ref 98–107)
CO2 SERPL-SCNC: 25 MMOL/L (ref 21–32)
CREAT SERPL-MCNC: 1.39 MG/DL (ref 0.7–1.3)
ERYTHROCYTE [DISTWIDTH] IN BLOOD BY AUTOMATED COUNT: 13.6 % (ref 11.5–17)
EST. AVERAGE GLUCOSE BLD GHB EST-MCNC: 134 MG/DL
GLOBULIN SER-MCNC: 4 GM/DL (ref 2–4)
GLUCOSE SERPL-MCNC: 73 MG/DL (ref 74–106)
HBA1C MFR BLD: 6.3 % (ref 4.2–6.3)
HCT VFR BLD AUTO: 29.2 % (ref 42–52)
HGB BLD-MCNC: 9.4 GM/DL (ref 14–18)
MCH RBC QN AUTO: 27.3 PG (ref 27–31)
MCHC RBC AUTO-ENTMCNC: 32.2 GM/DL (ref 33–36)
MCV RBC AUTO: 84.9 FL (ref 80–94)
NRBC BLD AUTO-RTO: 0 % (ref 0–0.2)
PLATELET # BLD AUTO: 176 X10(3)/MCL (ref 130–400)
PMV BLD AUTO: 9.4 FL (ref 7.4–10.4)
POTASSIUM SERPL-SCNC: 4.6 MMOL/L (ref 3.5–5.1)
PROT SERPL-MCNC: 6.8 GM/DL (ref 6.4–8.2)
RBC # BLD AUTO: 3.44 X10(6)/MCL (ref 4.7–6.1)
SODIUM SERPL-SCNC: 138 MMOL/L (ref 136–145)
WBC # SPEC AUTO: 5.4 X10(3)/MCL (ref 4.5–11.5)

## 2019-10-08 ENCOUNTER — HISTORICAL (OUTPATIENT)
Dept: INFUSION THERAPY | Facility: HOSPITAL | Age: 84
End: 2019-10-08

## 2019-10-09 ENCOUNTER — HISTORICAL (OUTPATIENT)
Dept: INFUSION THERAPY | Facility: HOSPITAL | Age: 84
End: 2019-10-09

## 2019-10-10 ENCOUNTER — HISTORICAL (OUTPATIENT)
Dept: INFUSION THERAPY | Facility: HOSPITAL | Age: 84
End: 2019-10-10

## 2019-10-10 LAB
ABS NEUT (OLG): 2.57 X10(3)/MCL (ref 2.1–9.2)
ALBUMIN SERPL-MCNC: 3.2 GM/DL (ref 3.4–5)
ALBUMIN/GLOB SERPL: 0.8 RATIO (ref 1.1–2)
ALP SERPL-CCNC: 62 UNIT/L (ref 45–117)
ALT SERPL-CCNC: 16 UNIT/L (ref 12–78)
AST SERPL-CCNC: 13 UNIT/L (ref 15–37)
BASOPHILS # BLD AUTO: 0 X10(3)/MCL (ref 0–0.2)
BASOPHILS NFR BLD AUTO: 1 %
BILIRUB SERPL-MCNC: 0.2 MG/DL (ref 0.2–1)
BILIRUBIN DIRECT+TOT PNL SERPL-MCNC: 0.1 MG/DL
BILIRUBIN DIRECT+TOT PNL SERPL-MCNC: 0.1 MG/DL (ref 0–0.2)
BUN SERPL-MCNC: 27 MG/DL (ref 7–18)
CALCIUM SERPL-MCNC: 8.5 MG/DL (ref 8.5–10.1)
CHLORIDE SERPL-SCNC: 106 MMOL/L (ref 98–107)
CO2 SERPL-SCNC: 27 MMOL/L (ref 21–32)
CREAT SERPL-MCNC: 1.7 MG/DL (ref 0.6–1.3)
EOSINOPHIL # BLD AUTO: 0.8 X10(3)/MCL (ref 0–0.9)
EOSINOPHIL NFR BLD AUTO: 16 %
ERYTHROCYTE [DISTWIDTH] IN BLOOD BY AUTOMATED COUNT: 13.6 % (ref 11.5–14.5)
GLOBULIN SER-MCNC: 4 GM/ML (ref 2.3–3.5)
GLUCOSE SERPL-MCNC: 109 MG/DL (ref 74–106)
HCT VFR BLD AUTO: 30.8 % (ref 40–51)
HGB BLD-MCNC: 9.4 GM/DL (ref 13.5–17.5)
IMM GRANULOCYTES # BLD AUTO: 0.03 10*3/UL
IMM GRANULOCYTES NFR BLD AUTO: 1 %
LYMPHOCYTES # BLD AUTO: 1 X10(3)/MCL (ref 0.6–4.6)
LYMPHOCYTES NFR BLD AUTO: 21 %
MCH RBC QN AUTO: 26.9 PG (ref 26–34)
MCHC RBC AUTO-ENTMCNC: 30.5 GM/DL (ref 31–37)
MCV RBC AUTO: 88 FL (ref 80–100)
MONOCYTES # BLD AUTO: 0.4 X10(3)/MCL (ref 0.1–1.3)
MONOCYTES NFR BLD AUTO: 9 %
NEUTROPHILS # BLD AUTO: 2.57 X10(3)/MCL (ref 2.1–9.2)
NEUTROPHILS NFR BLD AUTO: 53 %
PLATELET # BLD AUTO: 189 X10(3)/MCL (ref 130–400)
PMV BLD AUTO: 9.3 FL (ref 7.4–10.4)
POTASSIUM SERPL-SCNC: 4.6 MMOL/L (ref 3.5–5.1)
PROT SERPL-MCNC: 7.2 GM/DL (ref 6.4–8.2)
RBC # BLD AUTO: 3.5 X10(6)/MCL (ref 4.5–5.9)
SODIUM SERPL-SCNC: 139 MMOL/L (ref 136–145)
WBC # SPEC AUTO: 4.9 X10(3)/MCL (ref 4.5–11)

## 2019-10-11 ENCOUNTER — HISTORICAL (OUTPATIENT)
Dept: INFUSION THERAPY | Facility: HOSPITAL | Age: 84
End: 2019-10-11

## 2019-10-11 ENCOUNTER — HISTORICAL (OUTPATIENT)
Dept: ADMINISTRATIVE | Facility: HOSPITAL | Age: 84
End: 2019-10-11

## 2019-10-12 ENCOUNTER — HISTORICAL (OUTPATIENT)
Dept: INFUSION THERAPY | Facility: HOSPITAL | Age: 84
End: 2019-10-12

## 2019-10-13 ENCOUNTER — HISTORICAL (OUTPATIENT)
Dept: INFUSION THERAPY | Facility: HOSPITAL | Age: 84
End: 2019-10-13

## 2019-10-14 ENCOUNTER — HISTORICAL (OUTPATIENT)
Dept: ADMINISTRATIVE | Facility: HOSPITAL | Age: 84
End: 2019-10-14

## 2019-10-14 ENCOUNTER — HISTORICAL (OUTPATIENT)
Dept: INFUSION THERAPY | Facility: HOSPITAL | Age: 84
End: 2019-10-14

## 2019-10-14 LAB
ABS NEUT (OLG): 2.37 X10(3)/MCL (ref 2.1–9.2)
ALBUMIN SERPL-MCNC: 3 GM/DL (ref 3.4–5)
ALBUMIN/GLOB SERPL: 0.8 RATIO (ref 1–2)
ALP SERPL-CCNC: 57 UNIT/L (ref 45–117)
ALT SERPL-CCNC: 12 UNIT/L (ref 16–61)
AST SERPL-CCNC: 19 UNIT/L (ref 15–37)
BILIRUB SERPL-MCNC: 0.3 MG/DL (ref 0.2–1)
BILIRUBIN DIRECT+TOT PNL SERPL-MCNC: <0.1 MG/DL (ref 0–0.2)
BILIRUBIN DIRECT+TOT PNL SERPL-MCNC: >0.2 MG/DL (ref 0–1)
BUN SERPL-MCNC: 23 MG/DL (ref 7–18)
CALCIUM SERPL-MCNC: 8.6 MG/DL (ref 8.5–10.1)
CHLORIDE SERPL-SCNC: 108 MMOL/L (ref 98–107)
CO2 SERPL-SCNC: 25 MMOL/L (ref 21–32)
CREAT SERPL-MCNC: 1.26 MG/DL (ref 0.7–1.3)
ERYTHROCYTE [DISTWIDTH] IN BLOOD BY AUTOMATED COUNT: 13.6 % (ref 11.5–17)
GLOBULIN SER-MCNC: 4 GM/DL (ref 2–4)
GLUCOSE SERPL-MCNC: 98 MG/DL (ref 74–106)
HCT VFR BLD AUTO: 28.4 % (ref 42–52)
HGB BLD-MCNC: 9 GM/DL (ref 14–18)
MCH RBC QN AUTO: 26.7 PG (ref 27–31)
MCHC RBC AUTO-ENTMCNC: 31.7 GM/DL (ref 33–36)
MCV RBC AUTO: 84.3 FL (ref 80–94)
NRBC BLD AUTO-RTO: 0 % (ref 0–0.2)
PLATELET # BLD AUTO: 183 X10(3)/MCL (ref 130–400)
PMV BLD AUTO: 9.1 FL (ref 7.4–10.4)
POTASSIUM SERPL-SCNC: 4.6 MMOL/L (ref 3.5–5.1)
PROT SERPL-MCNC: 6.7 GM/DL (ref 6.4–8.2)
RBC # BLD AUTO: 3.37 X10(6)/MCL (ref 4.7–6.1)
SODIUM SERPL-SCNC: 138 MMOL/L (ref 136–145)
WBC # SPEC AUTO: 4.3 X10(3)/MCL (ref 4.5–11.5)

## 2019-10-15 ENCOUNTER — HISTORICAL (OUTPATIENT)
Dept: INFUSION THERAPY | Facility: HOSPITAL | Age: 84
End: 2019-10-15

## 2019-10-17 ENCOUNTER — HOSPITAL ENCOUNTER (OUTPATIENT)
Dept: NUTRITION | Facility: HOSPITAL | Age: 84
End: 2019-10-19
Attending: INTERNAL MEDICINE | Admitting: INTERNAL MEDICINE

## 2019-10-17 LAB
ABS NEUT (OLG): 2.46 X10(3)/MCL (ref 2.1–9.2)
ANION GAP SERPL CALC-SCNC: 18 MMOL/L
APPEARANCE, UA: CLEAR
APTT PPP: 30.8 SECOND(S) (ref 24.2–33.9)
BACTERIA SPEC CULT: ABNORMAL /HPF
BASOPHILS # BLD AUTO: 0 X10(3)/MCL (ref 0–0.2)
BASOPHILS NFR BLD AUTO: 1 %
BILIRUB UR QL STRIP: NEGATIVE
BUN SERPL-MCNC: 31 MG/DL (ref 8–26)
CHLORIDE SERPL-SCNC: 106 MMOL/L (ref 98–109)
COLOR UR: YELLOW
CREAT SERPL-MCNC: 1.6 MG/DL (ref 0.6–1.3)
EOSINOPHIL # BLD AUTO: 0.5 X10(3)/MCL (ref 0–0.9)
EOSINOPHIL NFR BLD AUTO: 11 %
ERYTHROCYTE [DISTWIDTH] IN BLOOD BY AUTOMATED COUNT: 13.3 % (ref 11.5–17)
GLUCOSE (UA): NEGATIVE
GLUCOSE SERPL-MCNC: 106 MG/DL (ref 70–105)
HCT VFR BLD AUTO: 34.3 % (ref 42–52)
HCT VFR BLD CALC: 34 % (ref 38–51)
HGB BLD-MCNC: 10.7 GM/DL (ref 14–18)
HGB BLD-MCNC: 11.6 MG/DL (ref 12–17)
HGB UR QL STRIP: NEGATIVE
INR PPP: 1.1 (ref 0–1.3)
KETONES UR QL STRIP: ABNORMAL
LACTATE SERPL-SCNC: 1.1 MMOL/L (ref 0.4–2)
LEUKOCYTE ESTERASE UR QL STRIP: ABNORMAL
LYMPHOCYTES # BLD AUTO: 1 X10(3)/MCL (ref 0.6–4.6)
LYMPHOCYTES NFR BLD AUTO: 22 %
MCH RBC QN AUTO: 27 PG (ref 27–31)
MCHC RBC AUTO-ENTMCNC: 31.2 GM/DL (ref 33–36)
MCV RBC AUTO: 86.4 FL (ref 80–94)
MONOCYTES # BLD AUTO: 0.4 X10(3)/MCL (ref 0.1–1.3)
MONOCYTES NFR BLD AUTO: 10 %
NEUTROPHILS # BLD AUTO: 2.46 X10(3)/MCL (ref 2.1–9.2)
NEUTROPHILS NFR BLD AUTO: 57 %
NITRITE UR QL STRIP: NEGATIVE
PH UR STRIP: 5 [PH] (ref 5–9)
PLATELET # BLD AUTO: 184 X10(3)/MCL (ref 130–400)
PMV BLD AUTO: 8.8 FL (ref 9.4–12.4)
POC IONIZED CALCIUM: 1.19 MMOL/L (ref 1.12–1.32)
POC TCO2: 25 MMOL/L (ref 24–29)
POTASSIUM BLD-SCNC: 4 MMOL/L (ref 3.5–4.9)
PROT UR QL STRIP: ABNORMAL
PROTHROMBIN TIME: 14.3 SECOND(S) (ref 12–14)
RBC # BLD AUTO: 3.97 X10(6)/MCL (ref 4.7–6.1)
RBC #/AREA URNS HPF: ABNORMAL /[HPF]
SODIUM BLD-SCNC: 143 MMOL/L (ref 138–146)
SP GR UR STRIP: 1.03 (ref 1–1.03)
SQUAMOUS EPITHELIAL, UA: ABNORMAL
UROBILINOGEN UR STRIP-ACNC: 1
WBC # SPEC AUTO: 4.3 X10(3)/MCL (ref 4.5–11.5)
WBC #/AREA URNS HPF: 5 /HPF (ref 0–3)

## 2019-10-19 LAB
BUN SERPL-MCNC: 26 MG/DL (ref 7–18)
CALCIUM SERPL-MCNC: 9 MG/DL (ref 8.5–10.1)
CHLORIDE SERPL-SCNC: 104 MMOL/L (ref 98–107)
CO2 SERPL-SCNC: 27 MMOL/L (ref 21–32)
CREAT SERPL-MCNC: 1.31 MG/DL (ref 0.7–1.3)
CREAT/UREA NIT SERPL: 19.8
GLUCOSE SERPL-MCNC: 70 MG/DL (ref 74–106)
POTASSIUM SERPL-SCNC: 4.2 MMOL/L (ref 3.5–5.1)
SODIUM SERPL-SCNC: 139 MMOL/L (ref 136–145)

## 2022-04-10 ENCOUNTER — HISTORICAL (OUTPATIENT)
Dept: ADMINISTRATIVE | Facility: HOSPITAL | Age: 87
End: 2022-04-10

## 2022-04-24 VITALS
HEIGHT: 65 IN | WEIGHT: 162.5 LBS | DIASTOLIC BLOOD PRESSURE: 63 MMHG | BODY MASS INDEX: 27.07 KG/M2 | OXYGEN SATURATION: 100 % | SYSTOLIC BLOOD PRESSURE: 135 MMHG

## 2022-04-30 NOTE — ED PROVIDER NOTES
Patient:   Tonio Mendoza             MRN: [id number]            FIN: [id number]               Age:   89 years     Sex:  Male     :  1928   Associated Diagnoses:   Generalized weakness; Tinea cruris   Author:   Dena Romero MD      Basic Information   Time seen: Date & time 10/17/2019 11:51:00.   History source: EMS.   Arrival mode: Ambulance.   History limitation: None.      History of Present Illness   The patient presents with weakness, Pt reportedly having increasing weakness over the last 3 days.  Fell and hit head yesterday w/o known LOC. Pt has dementia.  [facility] and     , Dr. Romero assumed care of this patient at 1159.    89 y/o white male on Plavix with hx of Alzheimer's, AFib, DM, and HTN presents to the ED from [facility] after having generalized weakness for three days.The nursing home stated that he is usually ambulatory, however he has been so weak that he cannot ambulate anymore. According to the nursing home records, the pt fell yesterday (10.16.19) at 1315 and suffered a laceration to the back of his head..  The onset was 3  days ago.  The course/duration of symptoms is constant.  The character of symptoms is generalized and unable to walk.  The degree at present is minimal.  Risk factors consist of age, recent fall (10.16.19) and anticoagulated (Plavix).  Prior episodes: none.  Therapy today: emergency medical services.  Associated symptoms: unable to ambulate, denies abdominal pain, denies shortness of breath and denies cough.        Review of Systems   Constitutional symptoms:  Weakness (generalized), non-ambulatory, No fever,    Skin symptoms:  No rash,    Eye symptoms:  No recent vision problems,    ENMT symptoms:  No sore throat,    Respiratory symptoms:  No shortness of breath, no cough.    Cardiovascular symptoms:  No chest pain, no syncope.    Gastrointestinal symptoms:  No abdominal pain, no nausea, no vomiting, no diarrhea, no constipation.     Genitourinary symptoms:  No dysuria, no hematuria.    Neurologic symptoms:  No headache,    Psychiatric symptoms:  Negative except as documented in HPI.   Endocrine symptoms:  Negative except as documented in HPI.   Hematologic/Lymphatic symptoms:  Negative except as documented in HPI.             Additional review of systems information: All other systems reviewed and otherwise negative.      Health Status   Allergies:    Allergic Reactions (Selected)  Severity Not Documented  Cortisone- No reactions were documented.  Doxycycline- No reactions were documented.  E-Mycin- No reactions were documented.  Neomycin- No reactions were documented.  MAGNUS (thromboangiitis obliterans).....- No reactions were documented.  Tetracycline- No reactions were documented.  Zithromax- No reactions were documented..   Medications: Per nurse's notes.   Immunizations: Per nurse's notes.      Past Medical/ Family/ Social History   Medical history:    Active  AD (Alzheimer's disease) (2BL2LSY0-155P-4U03-9D45-1ZS2GXB3E21B)  Atrial fibrillation (K1Z3U8WI-529D-4135-S291-E9CR79I33460)  Disorder of circulatory system (CO63TP9Y-3960-59FI-6EB5-516XN09H3VZ3)  Diabetes mellitus type 2 in nonobese (4H1BN509-84WU-37JI-3B64-57U55L81TFNN)  Essential hypertension (1N1B765H-TX25-4N64-BWO8-FDGA8FG8HGT0)  Chronic ischemic heart disease (39629936-4S86-7414-3C60-5752U8624CNN)  Gastritis (X3PL6A26-5009-0517-9SYO-07T9R237X6GH)  Esophageal reflux (2ZH125TS-28D7-2UQ6-87A4-TJW21578IE2Z)  Resolved  Nystagmus, unspecified (WS1LS602-1G85-2593-XAHL-Y6C774B0J07M):  Resolved.  Hyperlipidemia (T3F3EF73-J051-5IC8-CA60-9V958462LW4J):  Resolved.  Hypothyroidism (TO437YZ4-97M2-828C-OB23-581C10VHC096):  Resolved.  Hypertrophy of prostate without urinary obstruction (9JT475J8-09G2-07F5-2AI0-DQ806664509Q):  Resolved.  Psychosis (7W065848-NVJ7-8I61-W0P7-Q5E0M0011P85):  Resolved.  History of tobacco use (7TGE324H-J14N-775D-H067-9K4COQ94LX01):  Resolved.  MRSA ([id number]):   Resolved., AD (Alzheimer's disease)   Atrial fibrillation   CAD (coronary atherosclerotic disease)   Chronic ischemic heart disease   Claudication   Dementia   Diabetes mellitus type 2 in nonobese   Disorder of circulatory system   Esophageal reflux   Essential hypertension   Gastritis   HTN (hypertension)   Hyperlipidemia   PAD (peripheral artery disease)   Pressure ulcer stage 2   Rest pain   Scrotal hernia   Tobacco user .   Surgical history:    multiple LE pta..   Family history: Pt did not give family hx.   Social history:    Social & Psychosocial Habits    Alcohol  04/23/2012 Risk Assessment: Denies Alcohol Use    09/30/2019  Use: Past    Employment/School  01/25/2017  Status: Retired    Exercise  09/16/2019  Times per week: Daily    Comment: PT - 09/16/2019 09:22 - Camden Galeano LPN    Home/Environment    Comment: Nursing home  resident - 01/25/2017 10:40 - Cecilia Mueller RN    Nutrition/Health  09/16/2019  Home Diet Regular    Appetite Good    Substance Use  09/16/2019  Use: Past    Tobacco  04/23/2012 Risk Assessment: Denies Tobacco Use    10/15/2019  Use: 5-9 cigarettes (between 1    Patient Wants Consult For Cessation Counseling No    Concerns about tobacco use in household: No    Abuse/Neglect  10/15/2019  SHX Any signs of abuse or neglect No  , Occupation: Retired, Family/social situation: Nursing home resident ([facility]).      Physical Examination               Vital Signs   Vital Signs   10/17/2019 11:37 CDT     Temperature Temporal Artery               36.5 DegC                             Peripheral Pulse Rate     54 bpm  LOW                             Respiratory Rate          16 br/min                             SpO2                      99 %                             Oxygen Therapy            Room air                             Systolic Blood Pressure   158 mmHg  HI                             Diastolic Blood Pressure  55 mmHg  LOW  .   Measurements   10/17/2019 11:37 CDT     Weight  Dosing             68 kg                             Weight Measured and Calculated in Lbs     149.91 lb                             Weight Estimated          68 kg                             Height/Length Dosing      162 cm                             Height/Length Estimated   162 cm                             Body Mass Index Estimated 25.91 kg/m2  .   Basic Oxygen Information   10/17/2019 11:37 CDT     SpO2                      99 %                             Oxygen Therapy            Room air  .   General:  Alert, no acute distress, generally weak .    Skin:  Warm, dry, pink, intact, tinea cruris.    Head:  Normocephalic, atraumatic.    Neck:  Supple, trachea midline.    Eye:  Extraocular movements are intact, normal conjunctiva.    Ears, nose, mouth and throat:  Oral mucosa moist.   Cardiovascular:  Regular rate and rhythm.   Respiratory:  Lungs are clear to auscultation, respirations are non-labored, breath sounds are equal, Symmetrical chest wall expansion.    Gastrointestinal:  Soft, Nontender, Non distended, Normal bowel sounds.    Genitourinary:  tinea cruris  .   Back:  Normal range of motion.   Musculoskeletal:  Normal ROM.   Neurological:  No focal neurological deficit observed, A/Ox3.    Psychiatric:  Cooperative, appropriate mood & affect.       Medical Decision Making   Differential Diagnosis:  Weakness, dizziness, anemia, hypotension, dehydration, hypoglycemia, viral syndrome, pneumonia, sepsis, urinary tract infection, electrolyte imbalance, deconditioning.    Rationale:  Patient presents to ED from his nursing home after sustaining a fall yesterday due to progressively worsening generalized weakness.  Apparently at baseline he is completely ambulatory.  To ambulate was unable to do so and esophagus baseline per nursing home therefore discussed hospitalist for observation admission for generalized weakness and inability to ambulate.   Documents reviewed:  Emergency department nurses' notes.    Orders  Launch Orders   Laboratory:  PTT (Order): Routine collect, 10/17/2019 11:54 CDT, Blood, Lab Collect, Print Label By Order Location, 10/17/2019 11:54 CDT  PT (Protime) (Order): Stat collect, 10/17/2019 11:53 CDT, Blood, Lab Collect, Print Label By Order Location, 10/17/2019 11:53 CDT  Urinalysis with reflex to culture (Order): Stat collect, Urine, 10/17/2019 11:53 CDT, Nurse collect, Print Label By Order Location  CBC - includes Diff (Order): Stat collect, 10/17/2019 11:52 CDT, Blood, Lab Collect, Print Label By Order Location, 10/17/2019 11:52 CDT  POC ISTAT BMP Request (Order): Blood, Routine collect, 10/17/2019 11:52 CDT by Padma Mata PA-C, Lab Collect, Print Label By Order Location  Radiology:  CT Head W/O Contrast (Order): Stat, 10/17/2019 11:52 CDT, Other (please specify), fall - on plavix, None, Ambulatory, Patient Has IV?, Rad Type, Schedule this test  Cardiology:  EKG (Order): 10/17/2019 11:52 CDT, Stat, Once, 10/17/2019 11:52 CDT, Ambulatory, Patient Has IV, Standard Precautions, -1, -1, 10/17/2019 11:52 CDT.   Electrocardiogram:  Time 10/17/2019 11:51:00, rate 51, Sinus bradycardia, left axis deviation, nonspecific intraventricular block, no STEMI.    Results review:  Lab results : Lab View   10/17/2019 13:58 CDT     Lactic Acid Lvl           1.1 mmol/L    10/17/2019 13:18 CDT     UA Appear                 CLEAR                             UA Color                  YELLOW                             UA Spec Grav              1.030                             UA Bili                   Negative                             UA pH                     5.0                             UA Urobilinogen           1.0                             UA Blood                  Negative                             UA Glucose                Negative                             UA Ketones                Trace                             UA Protein                2+                             UA Nitrite                 Negative                             UA Leuk Est               Trace                             UA WBC                    5 /HPF  HI                             UA RBC                    NONE SEEN                             UA Bacteria               NONE SEEN /HPF                             UA Squam Epithelial       NONE SEEN    10/17/2019 12:15 CDT     POC Sodium                143 mmol/L                             POC Potassium             4.0 mmol/L                             POC Chloride              106 mmol/L                             POC Ion Calcium           1.19 mmol/L                             POC Glucose               106 mg/dL  HI                             POC BUN                   31.0 mg/dL  HI                             POC Creatinine            1.6 mg/dL  HI                             POC AGAP                  18.0  NA                             POC Hb                    11.6 mg/dL  LOW                             POC Hct                   34.0 %  LOW                             POC TCO2                  25.0 mmol/L    10/17/2019 12:09 CDT     PT                        14.3 second(s)  HI                             INR                       1.1                             PTT                       30.8 second(s)                             WBC                       4.3 x10(3)/mcL  LOW                             RBC                       3.97 x10(6)/mcL  LOW                             Hgb                       10.7 gm/dL  LOW                             Hct                       34.3 %  LOW                             Platelet                  184 x10(3)/mcL                             MCV                       86.4 fL                             MCH                       27.0 pg                             MCHC                      31.2 gm/dL  LOW                             RDW                       13.3 %                             MPV                       8.8 fL  LOW                              Abs Neut                  2.46 x10(3)/mcL                             Neutro Auto               57 %  NA                             Lymph Auto                22 %  NA                             Mono Auto                 10 %  NA                             Eos Auto                  11 %  NA                             Abs Eos                   0.5 x10(3)/mcL                             Basophil Auto             1 %  NA                             Abs Neutro                2.46 x10(3)/mcL                             Abs Lymph                 1.0 x10(3)/mcL                             Abs Mono                  0.4 x10(3)/mcL                             Abs Baso                  0.0 x10(3)/mcL  .   Radiology results:  Reviewed radiologist's report, Rad Results (ST)  < 12 hrs   Accession: [id number]  Order: CT Head W/O Contrast  Report Dt/Tm: 10/17/2019 12:57  Report:   EXAM: CT HEAD WITHOUT CONTRAST     History: fall - on plavix     Comparison studies: CT head dated 6/23/2017     Technique:   Axial scans were obtained from skull base to the vertex.  Coronal and sagittal reconstructions obtained from the axial data.   Automatic exposure control was utilized to limit radiation dose.  Contrast: None     Radiation Dose:  Total DLP: 1375 mGy*cm     DISCUSSION:     There is no acute intracranial hemorrhage or edema. There are patchy  periventricular and deep white matter hypodensities. There is a  hypodensity in the right basal ganglia. The gray-white matter  differentiation is preserved.     There is no mass effect or midline shift. The ventricles and sulci are  normal in size. The basal cisterns are patent. There is no abnormal  extra-axial fluid collection.     The calvarium and skull base are intact. The visualized paranasal  sinuses are clear. There is a small amount of fluid in the right  mastoid air cells.        IMPRESSION:  No acute intracranial abnormality.      .       Reexamination/  Reevaluation   Time: 10/17/2019 14:12:00 .   Notes: will give IVF then attempt to ambulate and if able to do so, likely dehydration causing his generalized weakness.   Time: 10/17/2019 17:09:00 .   Notes: still unable to ambulate despite 1L IVF, called NH and states he is completely ambulatory at baseline and is not at his baseline.      Impression and Plan   Diagnosis   Generalized weakness (GVU72-DF R53.1)   Tinea cruris (QJP92-QR B35.6)      Calls-Consults   -  10/17/2019 17:09:00 , hospitalist, recommends admit to [facility].    Plan   Condition: Improved.    Disposition: Admit time  10/17/2019 17:09:00.    Follow up with: Follow-up with PCP in 3 to 5 days; Report to Emergency Department if symptoms return or worsen.    Counseled: Patient, Regarding diagnosis, Regarding diagnostic results, Regarding treatment plan, Patient indicated understanding of instructions.    Notes:   I, Nadia Lindsey, acted solely as a scribe for and in the presence of Dr. Romero who performed the service..       Addendum   I, Dena Romero MD, performed the history, physical examination, MDM and procedures as above and agree with the scribe's documentation

## 2022-04-30 NOTE — ED PROVIDER NOTES
Patient:   Tonio Mendoza             MRN: [id number]            FIN: [id number]               Age:   89 years     Sex:  Male     :  1928   Associated Diagnoses:   Medical screening exam; Acute renal failure (ARF)   Author:   Arturo Walton MD      Basic Information   Time seen: Date & time 2019 17:50:00.   History source: Patient.   Arrival mode: Private vehicle.   Additional information: Chief Complaint from Nursing Triage Note : Chief Complaint   2019 17:37 CDT      Chief Complaint           Pt sent from [facility] with reports of being seen by PCP today and told to return to ED for PICC placement and admission. Pt voices no complaints    2019 9:25 CDT       Chief Complaint           treatment resistant tinea cruris f/u    2019 9:23 CDT       Chief Complaint           treatment resistant tinea cruris f/u  .   Provider/Visit info:   Time Seen:  Arturo Walton MD / 2019 17:37  .   History of Present Illness   The patient presents with Dr De La Cruz Referred admission, coming from NH for acute renal failure, on Micafungin. .  The onset was just prior to arrival.  The degree at onset was Pt asymptomatic.  The degree at present is none.  Risk factors consist of hypertension, diabetes mellitus and afib, alzheimers.  Therapy today: prescription medications.  Associated symptoms: none.        Review of Systems             Additional review of systems information: All other systems reviewed and otherwise negative.      Health Status   Allergies:    Allergic Reactions (Selected)  Severity Not Documented  Cortisone- No reactions were documented.  Doxycycline- No reactions were documented.  E-Mycin- No reactions were documented.  Neomycin- No reactions were documented.  MAGNUS (thromboangiitis obliterans).....- No reactions were documented.  Tetracycline- No reactions were documented.  Zithromax- No reactions were documented..      Past Medical/ Family/ Social History   Medical  history:    Active  AD (Alzheimer's disease) (9YA4LUL3-292K-5A65-6L21-1QQ4YCK0A05Z)  Atrial fibrillation (W0G4R9QQ-186J-2062-Z970-B8CB87X01156)  Disorder of circulatory system (ZU75XE4N-4460-41FC-4YC6-052YW36K0AM7)  Diabetes mellitus type 2 in nonobese (2U9AU042-99GS-53OT-4G33-01O83V66LADL)  Essential hypertension (6I1C792A-RI81-0M54-XUC8-JUUL7IQ8MBW3)  Chronic ischemic heart disease (77163257-7H11-9439-2U87-3339V2258IBB)  Gastritis (M3QM6F76-0766-7724-8RKO-16U0B419Y4EZ)  Esophageal reflux (5JY640RH-10F3-3BT9-75D4-TVU52630YR0O)  Resolved  Nystagmus, unspecified (YJ6HR016-9Q10-8662-NJJE-G2Z996V5B88O):  Resolved.  Hyperlipidemia (H7U4NM62-S875-8PE1-ZJ14-4L540472AL0V):  Resolved.  Hypothyroidism (VG545KU0-25G5-334P-IG86-206N51AQS424):  Resolved.  Hypertrophy of prostate without urinary obstruction (1JS557I4-63T3-88P2-1IF4-XO472141477A):  Resolved.  Psychosis (1T349931-HHN6-7S03-Z7Q2-D5A3Y9993O87):  Resolved.  History of tobacco use (5HDX178R-A08I-278M-E209-7L9CDK42UU92):  Resolved.  MRSA ([id number]):  Resolved..   Surgical history:    multiple LE pta..   Family history:    .   Social history: Alcohol use: Denies, Tobacco use: Denies, Drug use: Denies.      Physical Examination               Vital Signs   Vital Signs   9/30/2019 17:37 CDT      Temperature Oral          36.5 DegC                             Temperature Oral (calculated)             97.70 DegF                             Peripheral Pulse Rate     55 bpm  LOW                             Respiratory Rate          18 br/min                             SpO2                      97 %                             Oxygen Therapy            Room air                             Systolic Blood Pressure   123 mmHg                             Diastolic Blood Pressure  52 mmHg  LOW    9/30/2019 9:29 CDT       Peripheral Pulse Rate     61 bpm                             Systolic Blood Pressure   92 mmHg                             Diastolic Blood Pressure  52 mmHg  LOW                              Mean Arterial Pressure, Cuff              65 mmHg                             Blood Pressure Location   Right arm                             Manual Cuff BP            No    9/30/2019 9:25 CDT       Peripheral Pulse Rate     47 bpm  LOW                             Respiratory Rate          16 br/min                             SpO2                      100 %                             Oxygen Therapy            Room air                             Systolic Blood Pressure   88 mmHg  LOW                             Diastolic Blood Pressure  38 mmHg  LOW                             Blood Pressure Location   Left arm                             Manual Cuff BP            No                             O2 SAT at rest            100 %  .   Measurements   9/30/2019 17:37 CDT      Weight Dosing             74.1 kg  (Modified)                            Weight Measured           74.1 kg                             Weight Measured and Calculated in Lbs     163.36 lb  (Modified)                            Weight Estimated          74.1 kg  (Modified)                            Height/Length Dosing      168 cm  (Modified)                            Height/Length Estimated   168 cm  (Modified)                            Body Mass Index Estimated 26.25 kg/m2  (Modified)   9/30/2019 9:25 CDT       Weight Dosing             73.8 kg                             Weight Measured           73.8 kg                             Weight Measured and Calculated in Lbs     162.70 lb                             Height/Length Dosing      165 cm                             Height/Length Measured    165 cm                             BSA Measured              1.84 m2                             Body Mass Index Measured  27.11 kg/m2  .   Basic Oxygen Information   9/30/2019 17:37 CDT      SpO2                      97 %                             Oxygen Therapy            Room air    9/30/2019 9:25 CDT       SpO2                       100 %                             Oxygen Therapy            Room air  .   General:  Alert, no acute distress.    Skin:  Intact.   Head:  Atraumatic.   Neck:  Trachea midline.   Eye:  Pupils are equal, round and reactive to light, extraocular movements are intact.    Ears, nose, mouth and throat:  Oral mucosa moist.   Cardiovascular:  Regular rate and rhythm.   Respiratory:  Lungs are clear to auscultation.   Gastrointestinal:  Soft, Nontender, Non distended, Normal bowel sounds.    Neurological:  Alert and oriented to person, place, time, and situation.   Psychiatric:  Cooperative.      Medical Decision Making   Electrocardiogram:  Time 9/30/2019 18:19:00, rate 61, normal sinus rhythm, EP Interp, sinus arrhythmia, nonspecific intraventricular block.       Impression and Plan   Diagnosis   Medical screening exam (PNED [id number])   Acute renal failure (ARF) (ETF15-PS N17.9)      Calls-Consults   -  9/30/2019 17:57:00 , Irwin ALMEIDA, [facility], Medicine.    Plan   Disposition: Admit time  9/30/2019 17:57:00, Admit to Inpatient Telemetry Unit.

## 2022-05-03 NOTE — HISTORICAL [facility] CERNER
This is a historical note converted from Zenaida. Formatting and pictures may have been removed.  Please reference Zenaida for original formatting and attached multimedia. Chief Complaint  Patient admitted to  by ID following failure of outpatient?therapy of?tinea cruris  History of Present Illness  Patient is a poor historian?as he is hard of hearing, history is?from chart review. Mr. Tonio Mendoza is a 89 y/o [facility] patient who is here for f/u of?tinea cruris.? He is accompanied by a female caregiver.? Patient completed a two week course of Terbinafine without any any improvement. Prior to oral therapy?patient was treated with Ketoconazole therapy without improvement.??Pt has incontinence and wears adults diapers. He has erythema with skin breakdown to pubis, groin, and anal area.??Pt has hx of Alzheimers disease and dementia.? Pt denies fever, headaches, visual problems, N/V/D, SOB, cough or abdominal pain. Pt has a large scrotal hernia. He was previously referred to surgery.? Pt is a smoker. He smokes ~?4-5 cigs per day.  Review of Systems  Constitutional:negative unless stated in HPI  Eye: negative unless stated in HPI  ENMT: negative unless stated in HPI  Respiratory: negative unless stated in HPI  Cardiovascular: negative unless stated in HPI  Gastrointestinal: negative unless stated in HPI  Genitourinary: negative unless stated in HPI  Hema/Lymph: negative unless stated in HPI  Endocrine: negative unless stated in HPI  Immunologic: negative unless stated in HPI  Musculoskeletal: ambulates well without assistance  Integumentary: negative unless stated in HPI  Neurologic: negative unless stated in HPI  All Other ROS: ?AAOX3, no distress?  Physical Exam  Vitals & Measurements  T:?36.8? ?C (Oral)? TMIN:?36.8? ?C (Oral)? TMAX:?36.9? ?C (Oral)? HR:?62(Peripheral)? RR:?16? BP:?156/91? SpO2:?98%? WT:?78.9?kg?  Eye: PERRL, no icterus, normal conjunctivae  Respiratory: CTA, BBS, no SOB, brisk capp  refill  Cardiovascular: RRR, s1 s2 noted,?no chest pain, no edema,  Gastrointestinal: BS + 4 quads, soft NT, ND, neg CVAT bilateral, no organomegaly  Genitourinary: right inguinal hernia  Lymphatics: no LAD  Musculoskeletal: LOBO well, no deformity  Integumentary: erythema with cracked sloughing skin noted to the groin area extending down bilateral inner thighs and around to the buttocks, no drainage noted, demarcated borders noted  Neurologic: CN II-IX intact???  Assessment/Plan  1)?Tinea cruris  -?nursing home has been applying Ketoconazole cream and Calmoseptine with no improvement.?  - pt has incontinence and wears adults diapers.  - erythema with skin breakdown to pubis, groin, and anal area.?  - failed outpatient Terbinafine 250mg 1 po q?day x 14 days.??  - admitted to IM for IV micafungin 10mg q24hr  - topical miconazole powder? applied locally  ?   2)?B/L hearing loss  - pt has?severe hearing loss  - doesnt use any hearing aids  ?   3)?Right inguinal hernia?  - will consult surgery  ?   4) Type 2 DM  - On Humulin?R?regular 100 units  -  and HbA1c 6.2  ?  5) Alzheimers disease/ Dementia?  - Pt on donepezil and memantine, trileptal  ?  6) CAD/HTN  - Patient on?losartan, clopidogrel  ?  ?  DVT prophylaxis: None  GI prophylaxis: None  IVF; None  Abx: IV micafungin  dispo: f/u with surgery for right inguinal hernia, fungal cultures  ?   ??  ?   Problem List/Past Medical History  Ongoing  AD (Alzheimers disease)  Atrial fibrillation  CAD (coronary atherosclerotic disease)  Chronic ischemic heart disease  Claudication  Dementia  Diabetes mellitus type 2 in nonobese  Disorder of circulatory system  Esophageal reflux  Essential hypertension  Gastritis  HTN (hypertension)  Hyperlipidemia  PAD (peripheral artery disease)  Rest pain  Scrotal hernia  Tobacco user  Historical  History of tobacco use  Hyperlipidemia  Hypertrophy of prostate without urinary obstruction  Hypothyroidism  MRSA  Nystagmus,  unspecified  Psychosis  Procedure/Surgical History  Extraction of Left Foot Skin, External Approach (06/26/2017)  Extraction of Right Foot Skin, External Approach (06/26/2017)  Repair Face Skin, External Approach (02/23/2017)  Simple repair of superficial wounds of face, ears, eyelids, nose, lips and/or mucous membranes; 2.6 cm to 5.0 cm (02/23/2017)  Dilation of Left Femoral Artery, Percutaneous Approach (01/26/2017)  Dilation of Right Femoral Artery with Intraluminal Device, Percutaneous Approach (01/26/2017)  Extirpation of Matter from Left Femoral Artery, Percutaneous Approach (01/26/2017)  Extirpation of Matter from Right Femoral Artery, Percutaneous Approach (01/26/2017)  Fluoroscopy of Aorta and Bilateral Lower Extremity Arteries using Low Osmolar Contrast (01/26/2017)  Revascularization, endovascular, open or percutaneous, femoral, popliteal artery(s), unilateral; with atherectomy, includes angioplasty within the same vessel, when performed (01/26/2017)  Revascularization, endovascular, open or percutaneous, femoral, popliteal artery(s), unilateral; with transluminal stent placement(s) and atherectomy, includes angioplasty within the same vessel, when performed (01/26/2017)  Selective catheter placement, arterial system; each first order abdominal, pelvic, or lower extremity artery branch, within a vascular family (01/26/2017)  Dilation of Left Femoral Artery, Percutaneous Approach (06/08/2016)  Fluoroscopy of Abdominal Aorta using Low Osmolar Contrast (06/08/2016)  Fluoroscopy of Bilateral Renal Arteries using Low Osmolar Contrast (06/08/2016)  Fluoroscopy of Left Lower Extremity Arteries using Low Osmolar Contrast (06/08/2016)  Introduction of Other Therapeutic Substance into Peripheral Artery, Percutaneous Approach (06/08/2016)  Insertion of Infusion Device into Superior Vena Cava, Percutaneous Approach (06/07/2016)  Dilation of Left Femoral Artery, Percutaneous Approach (12/17/2015)  Extirpation of  Matter from Left Femoral Artery, Percutaneous Approach (12/17/2015)  Fluoroscopy of Bilateral Renal Arteries using Low Osmolar Contrast (12/17/2015)  Fluoroscopy of Left Lower Extremity Arteries using Low Osmolar Contrast (12/17/2015)  Fluoroscopy of Right Lower Extremity Arteries using Low Osmolar Contrast (12/17/2015)  Debridement (eg, high pressure waterjet with/without suction, sharp selective debridement with scissors, scalpel and forceps), open wound, (eg, fibrin, devitalized epidermis and/or dermis, exudate, debris, biofilm), including topic. (06/24/2013)  Nonexcisional debridement of wound, infection or burn (06/24/2013)  Debridement (eg, high pressure waterjet with/without suction, sharp selective debridement with scissors, scalpel and forceps), open wound, (eg, fibrin, devitalized epidermis and/or dermis, exudate, debris, biofilm), including topic. (04/26/2013)  Nonexcisional debridement of wound, infection or burn (04/26/2013)  multiple LE pta   Medications  Inpatient  acetaminophen, 650 mg= 2 tab(s), Oral, q6hr, PRN  miconazole 2% topical powder, 1 claudia, TOP, BID  Zofran, 4 mg= 2 mL, IV Push, q4hr, PRN  Home  acetaminophen 325 mg oral capsule, See Instructions, PRN  albuterol-ipratropium 3mg-0.5mg/3ml NEB solution  Anoro Ellipta 62.5 mcg-25 mcg/inh inhalation powder, 1 puff(s), INH, Daily  Aricept 10 mg oral tablet, 10 mg= 1 tab(s), Oral, Once a day (at bedtime)  Aspirin Enteric Coated, 81 mg, Oral, Daily  CeraVe topical cream, 1 claudia, TOP, Daily, PRN  Flonase 50 mcg/inh nasal spray, 1 spray(s), Nasal, Daily  glucagon 1 mg injection, 1 mg= 1 EA, IM, q10min, PRN  hydrOXYzine hydrochloride 25 mg oral tablet, 25 mg= 1 tab(s), Oral, QID  levothyroxine 100 mcg (0.1 mg) oral tablet, 100 mcg= 1 tab(s), Oral, Daily  losartan 25 mg oral tablet, 25 mg= 1 tab(s), Oral, Daily  Namenda 5 mg oral tablet, 5 mg= 1 tab(s), Oral, BID  NovoLIN R, Subcutaneous, QIDACHS  NovoLIN R 100 units/mL injectable solution, See  Instructions, PRN  omeprazole 20 mg oral DR capsule, 20 mg= 1 cap(s), Oral, Daily  oxcarbazepine 300 mg oral tablet, 300 mg= 1 tab(s), Oral, BID  Plavix 75 mg oral tablet, 75 mg= 1 tab(s), Oral, qPM  propranolol 10 mg oral tablet, 10 mg= 1 tab(s), Oral, Daily  Proscar 5 mg oral tablet, 5 mg= 1 tab(s), Oral, Daily  ranitidine 150 mg oral tablet, 150 mg= 1 tab(s), Oral, BID  simvastatin 10 mg oral tablet, 10 mg= 1 tab(s), Oral, qPM  Singulair 10 mg oral TABLET, 10 mg= 1 tab(s), Oral, qPM  Vistaril 25 mg oral capsule, 25 mg= 1 cap(s), Oral, q6hr, PRN  Allergies  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  ?Chemistry?   EAG:?131 mg/dL?High (08/07/19 12:45:00)   Hgb A1c: 6.2 % (08/07/19 12:45:00)   Diagnostic Results  Accession:?[id number]  Order:?XR Chest 1 View  Report Dt/Tm:?08/07/2019 14:44  Report:?  CHEST, 1 VIEW  ?  INDICATION: Cough.  ?  COMPARISON: Chest x-ray 11/16/2018  ?  FINDINGS: Frontal view of the chest was obtained. The cardiac  silhouette is enlarged, grossly stable. The aorta is partially  calcified. There is prominence and stiffness of the central  vasculature and hermelindo which may reflect pulmonary vascular congestion.  Increased interstitial opacities are seen which may related to  interstitial edema. ? ?No evidence of lobar type consolidation,  visible pneumothorax, or pleural effusion is seen. No acute displaced  fractures are visualized.  ?  ?  IMPRESSION:  1. The cardiac silhouette is  enlarged with possible central pulmonary  vascular congestion and mild interstitial edema.  ?  ?      PGY?II resident addendum  Patient seen and examined with Dr. Cunningham. ?Agree with details of?H&P as noted above.  Patient admitted from ID clinic?due to tinea cruris which did not improve following?2 weeks of terbinafine  Will start?micafungin?IV while inpatient  ID performed?skin scraping?today. ?Will follow-up results  Large right inguinal hernia present. ?Partially reducible.? Will consult surgery in the a.m. to evaluate.  Wound care consulted.? Appreciate recommendations.  On discharge, patient will likely need?close monitoring and care to prevent?worsening of rash.  ?  Devon Chery MD  IM?PGY-II  Pager [phone]  Team:?B2   Seen and examined the patient during morning rounds along with resident, reviewed chart, discussed assessment and plan, appropriate care provided. Agree with the note above.?

## 2022-05-03 NOTE — HISTORICAL [facility] CERNER
This is a historical note converted from Zenaida. Formatting and pictures may have been removed.  Please reference Cerelisabet for original formatting and attached multimedia. Admit and Discharge Dates  Admit Date: 09/30/2019  Discharge Date: 10/01/2019  Physicians  Attending Physician - Shane ALMEIDA, Maame  Admitting Physician - Shane ALMEIDA, Maame  Primary Care Physician - Brenda ALMEIDA, [facility]  Discharge Diagnosis  Acute renal failure (ARF)?N17.9  Medical screening exam?[id number]  Surgical Procedures  No procedures recorded for this visit.  Immunizations  No immunizations recorded for this visit.  Admission Information  Background: Mr Mendoza is a 89 y/o  male with a PMHx of Alzheimers dementia, SN hearing loss, Atrial fibrillation, hypothyroidism, CAD, Peripheral artery disease, HTN, HLD, DM2, and GERD who presents to the ED per request of his PCP for PICC placement for administration of antifungals for treatment resistant tinea cruris.? He has had tinea cruris since 5/22/19.? Initially he tried using OTC fungal creams, but he?presented to ID clinic?on 7/10/19 for nonresolution.? ID clinic placed him on ketoconazole cream with calmoseptine barrier, which did not improve, so on 7/24/19 he was initiated on terbinafine oral therapy.? On 8/7/19 after terbinafine treatment failure he was initiated on micafungin therapy IV, and was discharged back to the nursing home on PO terbinafine therapy x 6 weeks with nystatin cream with a plan to keep dumont catheter in.??Patient?was brought back to clinic?on 8/29/19 by his caregiver because he kept pulling his dumont catheter out and nonimprovement of his tinea likely related to him continuously soiling himself.? He was seen on 9/16/19 for management of his antifungals.?  ?   The patient presents today from ID clinic for medication adjustment per Dr. De La Cruz.? Case was discussed with Dr. De La Cruz, plan for PICC line placement for micafungin infusion  as outpatient.? Interview and ROS are difficult to obtain due to patient dementia as well as sensorineural hearing loss.? He does not report being in any acute distress, and denies any abdominal pain, chest pain, shortness of breath.? He denies fever or nausea.? The patient is able to answer yes or no questions but is otherwise difficult to interview.?In the ED Vitals were showed a BP of 88/38, patient was afebrile.? Patient was given 500cc bolus of NS in ED.? His BP on examination was improved to 123/52.? Labs in ID clinic?were significant for a potassium of 5.5, but was otherwise unremarkable.? Patient was admitted for ELIZABETH and setup of his picc line for outpatient micafungin.  Hospital Course  Patient was admitted for management of his tinea cruris, new ELIZABETH,?as well as for setup of outpatient antifungal therapy.? He was started on 70cc/hr of 1/2 NS for fluid resuscitation, and his creatinine improved to baseline of 1.7.? His baseline mental status is poor, and he has significant sensorineural hearing loss which made review of systems difficult to obtain.? However, he is able to answer questions about his current status.? He is alert and oriented to person only, thinks that the year is 2018 and does not know where he is.? Picc line was placed in the ED and he received his first dose of micafungin the day after his admission day.? He was discharged with a plan to continue micafungin therapy to completion, day of discharge was 10/1/19, anticipated date of completion is 10/14/19.  Significant Findings  none  Time Spent on discharge  >31 minutes  Objective  Vitals & Measurements  T:?36.9? ?C (Oral)? TMIN:?36.5? ?C (Oral)? TMAX:?36.9? ?C (Oral)? HR:?52(Monitored)? HR:?53(Peripheral)? RR:?19? BP:?137/50? SpO2:?96%? WT:?74.1?kg?  Physical Exam  Gen: He is alert and oriented to person only, does not know the date or where he is. No acute distress.  Head: Normocephalic  Eyes: PERRL, EOMI, no conjunctival injection or  pallor  Throat: No pharyngeal inflammation, erythema, discharge, mucous membranes moist  Neck: Supple. No carotid bruits.? No lymphadenopathy or thyromegaly.  Lungs: Mild crackles heard throughout all lung fields, patient not in acute respiratory distress, no accessory muscle use, no wheezes  CV: Normal S1 & S2, RRR, no murmurs appreciated  Abdomen: Soft, NT/ND, bowel sounds present.? No hepatosplenomegaly  Ext: Pulses weak but palpable?over both lower extremities DP/PT, No cyanosis/clubbing/edema  Genitourinary: Scrotal swelling present, no discharge, grossly erythematous area over genitals  Neuro:?Patient able to follow simple commands, CN II-XII grossly intact, strength 3/5 throughout  Psych: Flat affect, but denies suicidal or homicidal ideations  Skin: Erythematous rash present over groin, direct inguinal hernia on R side of groin, measuring about 3cm x 3cm protruding outwards 2cm  ?  Patient Discharge Condition  stable  Discharge Disposition  Discharge to nursing home, nursing home in agreement with plan to bring patient to infusion clinic daily for micafungin therapy.? Return to ID Clinic within 1 month of completing antibiotic therapy.? Continuing all home medications.   Discharge Medication Reconciliation  Continue  OXcarbazepine (oxcarbazepine 300 mg oral tablet)?300 mg, Oral, BID  acetaminophen (acetaminophen 325 mg oral capsule)?See Instructions, PRN as needed for fever  albuterol-ipratropium (albuterol-ipratropium 3mg-0.5mg/3ml NEB solution)  aspirin (Aspirin Enteric Coated)?81 mg, Oral, Daily  clopidogrel (Plavix 75 mg oral tablet)?75 mg, Oral, qPM  donepezil (Aricept 10 mg oral tablet)?10 mg, Oral, Once a day (at bedtime)  emollients, topical (CeraVe topical cream)?1 claudia, TOP, Daily, PRN for dry skin  finasteride (Proscar 5 mg oral tablet)?5 mg, Oral, Daily  fluticasone nasal (Flonase 50 mcg/inh nasal spray)?1 spray(s), Nasal, Daily  glucagon (glucagon 1 mg injection)?1 mg, IM, q10min, PRN blood  glucose  hydrOXYzine (hydrOXYzine hydrochloride 25 mg oral tablet)?25 mg, Oral, QID  insulin regular (NovoLIN R 100 units/mL injectable solution)?See Instructions, PRN blood glucose  ketoconazole topical (ketoconazole 2% topical cream)?1 claudia, TOP, BID  levothyroxine (levothyroxine 100 mcg (0.1 mg) oral tablet)?100 mcg, Oral, Daily  losartan (losartan 25 mg oral tablet)?25 mg, Oral, Daily  memantine (Namenda 5 mg oral tablet)?5 mg, Oral, BID  montelukast (Singulair 10 mg oral TABLET)?10 mg, Oral, qPM  nystatin topical (nystatin 100,000 units/g topical powder)?1 claudia, TOP, TID  omeprazole (omeprazole 20 mg oral DR capsule)?20 mg, Oral, Daily  propranolol (propranolol 10 mg oral tablet)?10 mg, Oral, Daily  ranitidine (ranitidine 150 mg oral tablet)?150 mg, Oral, BID  simvastatin (simvastatin 10 mg oral tablet)?10 mg, Oral, qPM  umeclidinium-vilanterol (Anoro Ellipta 62.5 mcg-25 mcg/inh inhalation powder)?1 puff(s), INH, Daily  Education and Orders Provided  Pollyck Itch, Easy-to-Read  Acute Kidney Injury, Adult  Discharge - 10/01/19 14:00:00 CDT, Dis/Trn Another Type Inst not defined, discharge to nursing home?  Discharge - 10/01/19 14:26:00 CDT, Dis/Trn Another Type Inst not defined, Dacia Taylor with 13 day outpatient infusion clinic visit?  Follow up  Report to Emergency Department if symptoms return or worsen  [facility] Infusion Clinic as scheduled  [facility] - Infectious Disease Clinic, within 1 month

## 2022-05-03 NOTE — HISTORICAL [facility] CERNER
This is a historical note converted from Cerelisabet. Formatting and pictures may have been removed.  Please reference Cerelisabet for original formatting and attached multimedia. Admit and Discharge Dates  Admit Date: 08/07/2019  Discharge Date: 08/09/2019  Physicians  Attending Physician - Josue ALMEIDA, Abhay  Admitting Physician - Josue ALMEIDA, Abhay  Consulting Physician - Jono ALMEIDA, Syed Fuller  Primary Care Physician - Brenda ALMEIDA, Vee CRUZ  Discharge Diagnosis  Candidal skin infection?B37.2  Fungal infection?B49  Tinea cruris?B35.6  Surgical Procedures  No procedures recorded for this visit.  Immunizations  No immunizations recorded for this visit.  Admission Information  Patient is a poor historian?as he is hard of hearing, history is?from chart review. Mr. Tonio Mendoza is a 89 y/o [facility] patient who is here for f/u of?tinea cruris.?PMH of HTN.HLD,PAD,GERD,CA,Hypothyroidism,Alzheimers dementia/severe hearing loss.?He is accompanied by a female caregiver.? Patient completed a two week course of Terbinafine without any any improvement. Prior to oral therapy?patient was treated with Ketoconazole therapy without improvement.??Pt has incontinence and wears adults diapers. He has erythema with skin breakdown to pubis, groin, and anal area.??Pt has hx of Alzheimers disease and dementia.? Pt denies fever, headaches, visual problems, N/V/D, SOB, cough or abdominal pain. Pt has a large scrotal hernia. He was previously referred to surgery.? Pt is a smoker. He smokes ~?4-5 cigs per day.  ?  Hospital Course  PT was treated with IV micafungin inpatient along with miconazole topical powder. Surgery recommended against repair of elective hernia given his age, medical comorbidities, smoking history, active skin infection of the groin and Plavix use. Fungal culture pending. Pt was discharged with Terbinafine PO?250mg?and topical nystatin for 6 weeks. We advised the nursing home to avoid starting  Flomax for the patient. Patient was placed with an indwelling Melissa catheter before discharge to nursing home.?  Significant Findings  Tinea cruris  right inguinal hernia  Time Spent on discharge  1 hour  Objective  Vitals & Measurements  T:?36.8 ?C (Oral)? TMIN:?36.3? ?C (Oral)? TMAX:?36.8? ?C (Oral)? HR:?52(Peripheral)? RR:?17? BP:?158/55? SpO2:?99%  Physical Exam  Eye: PERRL, no icterus, normal conjunctivae  Respiratory: CTA, BBS, no SOB, brisk capp refill  Cardiovascular: RRR, s1 s2 noted,?no chest pain, no edema,  Gastrointestinal: BS + 4 quads, soft NT, ND, neg CVAT bilateral, no organomegaly  Genitourinary: right inguinal hernia  Lymphatics: no LAD  Musculoskeletal: LOBO well, no deformity  Integumentary: skin intertrigo improved  Patient Discharge Condition  stable to be discharged to nursing home  Discharge Disposition  f/u with ID clinic   Discharge Medication Reconciliation  Prescribed  nystatin topical (nystatin 100,000 units/g topical powder)?1 claudia, TOP, BID  terbinafine (terbinafine 250 mg oral tablet)?250 mg, Oral, Daily  Continue  OXcarbazepine (oxcarbazepine 300 mg oral tablet)?300 mg, Oral, BID  acetaminophen (acetaminophen 325 mg oral capsule)?See Instructions, PRN as needed for fever  albuterol-ipratropium (albuterol-ipratropium 3mg-0.5mg/3ml NEB solution)  aspirin (Aspirin Enteric Coated)?81 mg, Oral, Daily  clopidogrel (Plavix 75 mg oral tablet)?75 mg, Oral, qPM  donepezil (Aricept 10 mg oral tablet)?10 mg, Oral, Once a day (at bedtime)  emollients, topical (CeraVe topical cream)?1 claudia, TOP, Daily, PRN for dry skin  finasteride (Proscar 5 mg oral tablet)?5 mg, Oral, Daily  fluticasone nasal (Flonase 50 mcg/inh nasal spray)?1 spray(s), Nasal, Daily  glucagon (glucagon 1 mg injection)?1 mg, IM, q10min, PRN blood glucose  hydrOXYzine (hydrOXYzine hydrochloride 25 mg oral tablet)?25 mg, Oral, QID  insulin regular (NovoLIN R 100 units/mL injectable solution)?See Instructions, PRN blood  glucose  levothyroxine (levothyroxine 100 mcg (0.1 mg) oral tablet)?100 mcg, Oral, Daily  losartan (losartan 25 mg oral tablet)?25 mg, Oral, Daily  memantine (Namenda 5 mg oral tablet)?5 mg, Oral, BID  montelukast (Singulair 10 mg oral TABLET)?10 mg, Oral, qPM  propranolol (propranolol 10 mg oral tablet)?10 mg, Oral, Daily  ranitidine (ranitidine 150 mg oral tablet)?150 mg, Oral, BID  simvastatin (simvastatin 10 mg oral tablet)?10 mg, Oral, qPM  umeclidinium-vilanterol (Anoro Ellipta 62.5 mcg-25 mcg/inh inhalation powder)?1 puff(s), INH, Daily  Discontinue  hydrOXYzine (Vistaril 25 mg oral capsule)?25 mg, Oral, q6hr, PRN as needed for itching  insulin regular (NovoLIN R), Subcutaneous, QIDACHS  omeprazole (omeprazole 20 mg oral DR capsule)?20 mg, Oral, Daily  Education and Orders Provided  Francis Prieto, Easy-to-Read  Discharge - 08/09/19 14:15:00 CDT, D/S to Medicaid Certified Nursing Facili, Patient should not be on Flomax?  Follow up  Report to Emergency Department if symptoms return or worsen, within 2 to 4 weeks  [facility] ID clinic in 2 weeks      I was present with the Resident during discharge day management.  ?  [ x?] I discussed the case with the Resident and agree with the discharge plan as above.  [ ?] I discussed the case with the Resident and agree with the discharge plan as above?except:  ?  Time spent on discharge [ >30?] minutes  ?

## 2022-05-03 NOTE — HISTORICAL [facility]ER
This is a historical note converted from [employer]. Formatting and pictures may have been removed.  Please reference [employer] for original formatting and attached multimedia. Admit and Discharge Dates  Admit Date: 10/17/2019  Discharge Date: 10/19/2019  Physicians  Attending Physician - Jorge ALMEIDA, Del JIMENEZ  Admitting Physician - Jorge ALMEIDA, Del JIMENEZ  Consulting Physician - Stephanie Barnhart  Primary Care Physician - Brenda ALMEIDA, Vee CRUZ  Discharge Diagnosis  Generalized weakness?R53.1  Tinea cruris?B35.6  Weakness or fatigue?[id number]  Surgical Procedures  No procedures recorded for this visit.  Immunizations  No immunizations recorded for this visit.  Admission Information  Patient is a 89-year-old male with a past medical history of Alzheimers dementia, hearing loss, atrial fibrillation (not on anticoagulation), hypothyroidism, CAD, diabetes mellitus, PAD, and additional past medical history as below who was brought into the nursing home for progressive generalized weakness.?Dacia harry his current nursing home reported that over the last 3 days hes become progressively weak with inability to ambulate, and at baseline is usually ambulatory.? He did have a fall?on 10/16/19?and suffered a small laceration to the back of his head.? On arrival to the ER he was noted to be slightly hypertensive, mildly bradycardic with a heart rate in the 50s, and afebrile.? Laboratory work only significant for chronic anemia which appears stable.? CT of his head and cervical spine were obtained in arrival and showed no acute process.? Chest x-ray noted no acute process as well.? EKG on arrival showed sinus bradycardia, nonspecific intraventricular block.? Patient was noted in the ER to have persistent tinea cruris which of note patient has undergone multiple rounds of treatment and just finished home IV antibiotics with micafungin on 10/15/2019, for a total of a 14 day course.? He?was  admitted to the hospitalist service for generalized weakness of unclear etiology. ?He was given IV fluids in the ER is concern for dehydration as a cause of his generalized weakness.  ?  ?  Hospital course:  ?Generalized weakness-likely deconditioning.? Other than laceration no injuries were found on work-up.? Patient received physical therapy.? Remained otherwise stable. ?He is being discharged back to nursing home.? Did not find any?infection on work-up.  Mechanical fall with head laceration?, unremarkable CT head/c-spine-PT OT?has seen the patient.  ?Small scalp laceration secondary to above  Recent completion of IV micafungin for tinea cruris (completed 10/15)  Alzheimers dementia-?advanced age. ?Supportive care.? Use PRN Haldol for agitation.  ?   Stable to discharge back to nursing home.  Hospital Course  Instructions:  -Fall precautions.  -Continue with?medications as before.  Significant Findings  Diagnostic Results  Accession:?[id number]  Order:?XR Chest 1 View  Report Dt/Tm:?10/17/2019 13:19  Report:?  History:  Weakness  ?  Reference:  30 September 2019  ?  Findings:  Portable frontal view of the chest was obtained. Stable cardiomegaly  with aortic atherosclerosis. Grossly clear lungs. No pneumothorax.  ?  Impression:?  No acute findings. Stable chest radiograph.  ?  ?  Accession:?[id number]  Order:?CT Cervical Spine W/O Contrast  Report Dt/Tm:?10/17/2019 13:06  Report:?  History: Trauma  Comparison studies:  None  ?  Technique:?  Axial CT images were obtained through the cervical region.  Coronal and sagittal reconstructions obtained from the axial data.  Automatic exposure control was utilized to limit radiation dose.  Contrast: None.  ?  Radiation Dose:?  Total DLP: 564 mGy*cm  ?  DISCUSSION:  ?  Fractures: None.  Alignment: Straightening of the normal lordosis. No subluxations.  Grade 1 anterolisthesis of C2 on C3, C3 on C4, and C5 on C6.  Soft tissues: No abnormalities.  ?  Degenerative  changes:  Mild degenerative canal stenosis at C2-3, moderate from C3 through C6,  severe at C6-7 related to disc osteophyte complexes.  Mild right and moderate left foraminal narrowing at C2-3, severe  bilaterally at C3-4 mild bilaterally at C4-5, mild on the left at C5-6  and mild on the right at C6-7 related to uncovertebral and facet  hypertrophy.  ?  IMPRESSION:  ?  1. No fractures.  ?  2. Ligament, spinal cord and/or vascular abnormalities cannot be  excluded on the basis of this examination.  ?  ?  Accession:?[id number]  Order:?CT Head W/O Contrast  Report Dt/Tm:?10/17/2019 12:57  Report:?  EXAM: CT HEAD WITHOUT CONTRAST  ?  History: fall - on plavix  ?  Comparison studies: CT head dated 6/23/2017  ?  Technique:?  Axial scans were obtained from skull base to the vertex.  Coronal and sagittal reconstructions obtained from the axial data.?  Automatic exposure control was utilized to limit radiation dose.  Contrast: None  ?  Radiation Dose:  Total DLP: 1375 mGy*cm  ?  DISCUSSION:  ?  There is no acute intracranial hemorrhage or edema. There are patchy  periventricular and deep white matter hypodensities. There is a  hypodensity in the right basal ganglia. The gray-white matter  differentiation is preserved.  ?  There is no mass effect or midline shift. The ventricles and sulci are  normal in size. The basal cisterns are patent. There is no abnormal  extra-axial fluid collection.  ?  The calvarium and skull base are intact. The visualized paranasal  sinuses are clear. There is a small amount of fluid in the right  mastoid air cells.  ?  ?  IMPRESSION:  No acute intracranial abnormality.  ?  [1]  Time Spent on discharge  31 min  Objective  Vitals & Measurements  T:?36.9? ?C (Oral)? TMIN:?36.7? ?C (Oral)? TMAX:?37.2? ?C (Oral)? HR:?60(Peripheral)? RR:?18? BP:?141/52? SpO2:?95%? BMI:?26.13?  Physical Exam  GENERAL: awake, alert to self and location, however is very limited due to her advanced dementia.  HEENT:  normocephalic, small post scalp lac with steristrips , [place]  NECK: supple?  LUNGS: Limited exam but no wheezing?or crackles on anterolateral exam.  CVS: Regular rate and rhythm, normal peripheral perfusion  ABD: Soft, non-tender, non-distended, bowel sounds present  EXTREMITIES: no clubbing or cyanosis  SKIN: Warm, dry.??Erythematous plaque involving the entire groin area, no evidence of purulent drainage  NEURO: alert and oriented, grossly without focal deficits  PSYCHIATRIC: Cooperative [2]  Patient Discharge Condition  fair  Discharge Disposition  nursing home.   Discharge Medication Reconciliation  Continue  OXcarbazepine (oxcarbazepine 300 mg oral tablet)?300 mg, Oral, BID  acetaminophen (acetaminophen 325 mg oral capsule)?See Instructions, PRN as needed for fever  albuterol-ipratropium (albuterol-ipratropium 2.5 mg-0.5 mg/3 mL inhalation solution)?3 mL, NEB, QID  aspirin (Aspirin Enteric Coated)?81 mg, Oral, Daily  betamethasone topical (betamethasone valerate 0.1% topical cream)?1 claudia, TOP, BID  betamethasone-clotrimazole topical (betamethasone-clotrimazole 0.05%-1% topical cream)?1 claudia, TOP, BID  cholecalciferol (Vitamin D3 5000 intl units oral tablet)?5,000 IntUnit, Oral, Daily  clopidogrel (Plavix 75 mg oral tablet)?75 mg, Oral, qPM  donepezil (Aricept 10 mg oral tablet)?10 mg, Oral, Once a day (at bedtime)  emollients, topical (CeraVe topical cream)?1 claudia, TOP, Daily, PRN for dry skin  finasteride (Proscar 5 mg oral tablet)?5 mg, Oral, Daily  fluconazole (fluconazole 100 mg oral tablet)?100 mg, Oral, Daily  fluticasone nasal (Flonase 50 mcg/inh nasal spray)?1 spray(s), Nasal, Daily  glucagon (glucagon 1 mg injection)?1 mg, IM, q10min, PRN blood glucose  hydrOXYzine (hydrOXYzine hydrochloride 25 mg oral tablet)?25 mg, Oral, QID, PRN as needed for itching  hydrOXYzine (hydrOXYzine pamoate 25 mg oral capsule)?25 mg, Oral, QID  insulin regular (NovoLIN R 100 units/mL injectable solution)?See Instructions, PRN  blood glucose  ketoconazole topical (ketoconazole 2% topical cream)?1 claudia, TOP, BID  levothyroxine (levothyroxine 100 mcg (0.1 mg) oral tablet)?100 mcg, Oral, Daily  memantine (Namenda 5 mg oral tablet)?5 mg, Oral, BID  montelukast (Singulair 10 mg oral TABLET)?10 mg, Oral, qPM  omeprazole (omeprazole 20 mg oral DR capsule)?20 mg, Oral, Daily  ranitidine (ranitidine 150 mg oral tablet)?150 mg, Oral, Every other day  simvastatin (simvastatin 10 mg oral tablet)?10 mg, Oral, qPM  umeclidinium-vilanterol (Anoro Ellipta 62.5 mcg-25 mcg/inh inhalation powder)?1 puff(s), INH, Daily  Discontinue  albuterol-ipratropium (albuterol-ipratropium 3mg-0.5mg/3ml NEB solution)  cefdinir (cefdinir 300 mg oral capsule)?300 mg, Oral, BID  losartan (losartan 25 mg oral tablet)?25 mg, Oral, Daily  methylPREDNISolone (methylPREDNISolone 4 mg oral tab)  metoprolol (metoprolol succinate 25 mg oral tablet, extended release)?25 mg, Oral, Daily  nystatin topical (nystatin 100,000 units/g topical powder)?1 claudia, TOP, TID  propranolol (propranolol 10 mg oral tablet)?10 mg, Oral, Daily  tamsulosin (tamsulosin 0.4 mg oral capsule)?0.4 mg, Oral, Daily  terbinafine (terbinafine 250 mg oral tablet)?250 mg, Oral, Daily  Education and Orders Provided  Fall Prevention in the Home, Easy-to-Read  Discharge - 10/19/19 8:49:00 CDT, Dis/Trn to SNF, Give all scheduled vaccinations prior to discharge.?  Discharge Activity - Activity as Tolerated?  Discharge Diet - Regular?  Follow up  Report to Emergency Department if symptoms return or worsen  jeanne boyd [facility] [phone]  ?   Labs Last 72 Hours?  ?Chemistry? Hematology/Coagulation? Blood Gases?   Sodium Lvl: 139 mmol/L (10/19/19 05:56:00) PT:?14.3 second(s)?High (10/17/19 12:09:00) POC TCO2: 25 mmol/L (10/17/19 12:15:00)   POC Sodium: 143 mmol/L (10/17/19 12:15:00) INR: 1.1 (10/17/19 12:09:00) ?   Potassium Lvl: 4.2 mmol/L (10/19/19 05:56:00) PTT: 30.8 second(s) (10/17/19 12:09:00) ?   POC Potassium: 4  mmol/L (10/17/19 12:15:00) WBC:?4.3 x10(3)/mcL?Low (10/17/19 12:09:00) ?   Chloride: 104 mmol/L (10/19/19 05:56:00) RBC:?3.97 x10(6)/mcL?Low (10/17/19 12:09:00) ?   POC Chloride: 106 mmol/L (10/17/19 12:15:00) Hgb:?10.7 gm/dL?Low (10/17/19 12:09:00) ?   CO2: 27 mmol/L (10/19/19 05:56:00) POC Hb:?11.6 mg/dL?Low (10/17/19 12:15:00) ?   Calcium Lvl: 9 mg/dL (10/19/19 05:56:00) Hct:?34.3 %?Low (10/17/19 12:09:00) ?   POC Ion Calcium: 1.19 mmol/L (10/17/19 12:15:00) POC Hct:?34 %?Low (10/17/19 12:15:00) ?   Glucose Lvl:?70 mg/dL?Low (10/19/19 05:56:00) Platelet: 184 x10(3)/mcL (10/17/19 12:09:00) ?   POC Glucose:?106 mg/dL?High (10/17/19 12:15:00) MCV: 86.4 fL (10/17/19 12:09:00) ?   BUN:?26 mg/dL?High (10/19/19 05:56:00) MCH: 27 pg (10/17/19 12:09:00) ?   POC BUN:?31 mg/dL?High (10/17/19 12:15:00) MCHC:?31.2 gm/dL?Low (10/17/19 12:09:00) ?   Creatinine:?1.31 mg/dL?High (10/19/19 05:56:00) RDW: 13.3 % (10/17/19 12:09:00) ?   POC Creatinine:?1.6 mg/dL?High (10/17/19 12:15:00) MPV:?8.8 fL?Low (10/17/19 12:09:00) ?   BUN/Creat Ratio: 19.8 (10/19/19 05:56:00) Abs Neut: 2.46 x10(3)/mcL (10/17/19 12:09:00) ?   eGFR-AA: >60 (10/19/19 05:56:00) Neutro Auto: 57 % (10/17/19 12:09:00) ?   eGFR-ROBBIE: 55 mL/min/1.73 m2 (10/19/19 05:56:00) Lymph Auto: 22 % (10/17/19 12:09:00) ?   Lactic Acid Lvl: 1.1 mmol/L (10/17/19 13:58:00) Mono Auto: 10 % (10/17/19 12:09:00) ?   POC AGAP: 18 (10/17/19 12:15:00) Eos Auto: 11 % (10/17/19 12:09:00) ?   ? Abs Eos: 0.5 x10(3)/mcL (10/17/19 12:09:00) ?   ? Basophil Auto: 1 % (10/17/19 12:09:00) ?   ? Abs Neutro: 2.46 x10(3)/mcL (10/17/19 12:09:00) ?   ? Abs Lymph: 1 x10(3)/mcL (10/17/19 12:09:00) ?   ? Abs Mono: 0.4 x10(3)/mcL (10/17/19 12:09:00) ?   ? Abs Baso: 0 x10(3)/mcL (10/17/19 12:09:00) ?   ?     [1]?Admission H & P; Del Patel MD 10/17/2019 21:23 CDT  [2]?Progress/SOAP Note; Nikko Perez MD 10/18/2019 13:55 CDT

## 2022-05-03 NOTE — HISTORICAL [facility]
This is a historical note converted from [facility]. Formatting and pictures may have been removed.  Please reference Cerelisabet for original formatting and attached multimedia. Chief Complaint  Pt sent from [facility] with reports of being seen by PCP today and told to return to ED for PICC placement and admission. Pt voices no complaints  History of Present Illness  Background:Mr Mendoza is a 89 y/o  male with a PMHx of Alzheimers dementia, SN hearing loss, Atrial fibrillation, hypothyroidism, CAD, Peripheral artery disease, HTN, HLD, DM2, and GERD who presents to the ED per request of his PCP for PICC placement for administration of antifungals for treatment resistant tinea cruris.? He has had tinea cruris since 5/22/19.? Initially he tried using OTC fungal creams, but he?presented to ID clinic?on 7/10/19 for nonresolution.? ID clinic placed him on ketoconazole cream with calmoseptine barrier, which did not improve, so on 7/24/19 he was initiated on terbinafine oral therapy.? On 8/7/19 after terbinafine treatment failure he was initiated on micafungin therapy IV, and was discharged back to the nursing home on PO terbinafine therapy x 6 weeks with nystatin cream with a plan to keep dumont catheter in.??Patient?was brought back to clinic?on 8/29/19 by his caregiver because he kept pulling his dumont catheter out and nonimprovement of his tinea likely related to him continuously soiling himself.? He was seen on 9/16/19 for management of his antifungals.?  ?   The patient presents today from ID clinic for medication adjustment per Dr. De La Cruz.? Case was discussed with Dr. De La Cruz, plan for PICC line placement for micafungin infusion as outpatient.? Interview and ROS are difficult to obtain due to patient dementia as well as sensorineural hearing loss.? He does not report being in any acute distress, and denies any abdominal pain, chest pain, shortness of breath.? He denies fever or nausea.? The patient is able to answer  yes or no questions but is otherwise difficult to interview.?In the ED Vitals were showed a BP of 88/38, patient was afebrile.? Patient was given 500cc bolus of NS in ED.? His BP on examination was improved to 123/52.? Labs in ID clinic?were significant for a potassium of 5.5,  ?  Review of Systems  ROS difficult to obtain due to patient altered?mental status  Physical Exam  Vitals & Measurements  T:?36.5? ?C (Oral)? HR:?63(Peripheral)? HR:?63(Monitored)? RR:?20? BP:?163/57? SpO2:?98%? WT:?74.1?kg?  Gen: He is alert and oriented to person only, does not know the date or where he is. No acute distress.  Head: Normocephalic  Eyes: PERRL, EOMI, no conjunctival injection or pallor  Throat: No pharyngeal inflammation, erythema, discharge, mucous membranes moist  Neck: Supple. No carotid bruits.? No lymphadenopathy or thyromegaly.  Lungs: Mild crackles heard throughout all lung fields, patient not in acute respiratory distress, no accessory muscle use, no wheezes  CV: Normal S1 & S2, RRR, no murmurs appreciated  Abdomen: Soft, NT/ND, bowel sounds present.? No hepatosplenomegaly  Ext: Pulses weak but palpable?over both lower extremities DP/PT,No cyanosis/clubbing/edema  Genitourinary: Scrotal swelling present, no discharge, grossly erythematous area over genitals  Neuro:?Patient able to follow simple commands, CN II-XII grossly intact, strength 3/5 throughout  Psych: Flat affect, but denies suicidal or homicidal ideations  Skin: Erythematous rash present over groin, direct inguinal hernia on R side of groin, measuring about 3cm x 3cm protruding outwards 2cm  ?  Assessment/Plan  1. Tinea cruris with extensive history of treatment failure, with skin breakdown  -patient has been treated multiple times in the past with recurrence or nonresolution of tinea cruris  -PICC line placed, plan to initiate micafungin therapy 100mg qDAY IV x 2 weeks?starting tomorrow AM  -continue topical nyastatin ?  -diaper changes q1hr  -consulted  wound care for management of skin breakdown  ?  2. Acute kidney injury likely secondary to volume depletion due to poor PO intake  -Patient given 500cc bolus NS in ED  -1/2 NS 70cc/hr, will monitor renal indices for improvement in AM  -BUN/Cr 40/2.5 on admission, goal is baseline: Cr ->1.4  ?   3. Hyperkalemia  -Potassium 5.5 on labs in ID clinic, improved to 5.0 in ED  -given kayexalate x1  ?   4. Hypothyroidism  -last TSH 2.1 on 7/1/19  -will continue home dose of 100mcg synthroid po qd  ?  5. CAD/PAD  -continue ASA 81, plavix 75mg  ?  6. Hypertension  -hold antihypertensives due to patient being hypotensive at this time, will restart lisinopril when appropriate  ?  7. Diabetes mellitus type 2  -last A1c on 8/7/19: 6.2  -CBGs within normal limits, will hold diabetic medications for now  ?   prophylaxis  DVT: heparin 5000u BID  Nutrition: Cardiac meal plan  Infusions: 1/2 NS at 70cc/hr  Abx: none  -will continue oxcarbazepime until records available  ?   Disposition: Patient here for IV antifungals, PICC line placed and will likely start infusions of micafungin starting tomorrow.?  ?   Problem List/Past Medical History  Ongoing  AD (Alzheimers disease)  Atrial fibrillation  CAD (coronary atherosclerotic disease)  Chronic ischemic heart disease  Claudication  Dementia  Diabetes mellitus type 2 in nonobese  Disorder of circulatory system  Esophageal reflux  Essential hypertension  Gastritis  HTN (hypertension)  Hyperlipidemia  PAD (peripheral artery disease)  Rest pain  Scrotal hernia  Tobacco user  Historical  History of tobacco use  Hyperlipidemia  Hypertrophy of prostate without urinary obstruction  Hypothyroidism  MRSA  Nystagmus, unspecified  Psychosis  Procedure/Surgical History  Excision of Left Upper Leg Skin, External Approach, Diagnostic (08/07/2019)  Punch biopsy of skin (including simple closure, when performed); each separate/additional lesion (List separately in addition to code for primary procedure)  (08/07/2019)  Punch biopsy of skin (including simple closure, when performed); single lesion (08/07/2019)  Extraction of Left Foot Skin, External Approach (06/26/2017)  Extraction of Right Foot Skin, External Approach (06/26/2017)  Repair Face Skin, External Approach (02/23/2017)  Simple repair of superficial wounds of face, ears, eyelids, nose, lips and/or mucous membranes; 2.6 cm to 5.0 cm (02/23/2017)  Dilation of Left Femoral Artery, Percutaneous Approach (01/26/2017)  Dilation of Right Femoral Artery with Intraluminal Device, Percutaneous Approach (01/26/2017)  Extirpation of Matter from Left Femoral Artery, Percutaneous Approach (01/26/2017)  Extirpation of Matter from Right Femoral Artery, Percutaneous Approach (01/26/2017)  Fluoroscopy of Aorta and Bilateral Lower Extremity Arteries using Low Osmolar Contrast (01/26/2017)  Revascularization, endovascular, open or percutaneous, femoral, popliteal artery(s), unilateral; with atherectomy, includes angioplasty within the same vessel, when performed (01/26/2017)  Revascularization, endovascular, open or percutaneous, femoral, popliteal artery(s), unilateral; with transluminal stent placement(s) and atherectomy, includes angioplasty within the same vessel, when performed (01/26/2017)  Selective catheter placement, arterial system; each first order abdominal, pelvic, or lower extremity artery branch, within a vascular family (01/26/2017)  Dilation of Left Femoral Artery, Percutaneous Approach (06/08/2016)  Fluoroscopy of Abdominal Aorta using Low Osmolar Contrast (06/08/2016)  Fluoroscopy of Bilateral Renal Arteries using Low Osmolar Contrast (06/08/2016)  Fluoroscopy of Left Lower Extremity Arteries using Low Osmolar Contrast (06/08/2016)  Introduction of Other Therapeutic Substance into Peripheral Artery, Percutaneous Approach (06/08/2016)  Insertion of Infusion Device into Superior Vena Cava, Percutaneous Approach (06/07/2016)  Dilation of Left Femoral Artery,  Percutaneous Approach (12/17/2015)  Extirpation of Matter from Left Femoral Artery, Percutaneous Approach (12/17/2015)  Fluoroscopy of Bilateral Renal Arteries using Low Osmolar Contrast (12/17/2015)  Fluoroscopy of Left Lower Extremity Arteries using Low Osmolar Contrast (12/17/2015)  Fluoroscopy of Right Lower Extremity Arteries using Low Osmolar Contrast (12/17/2015)  Debridement (eg, high pressure waterjet with/without suction, sharp selective debridement with scissors, scalpel and forceps), open wound, (eg, fibrin, devitalized epidermis and/or dermis, exudate, debris, biofilm), including topic. (06/24/2013)  Nonexcisional debridement of wound, infection or burn (06/24/2013)  Debridement (eg, high pressure waterjet with/without suction, sharp selective debridement with scissors, scalpel and forceps), open wound, (eg, fibrin, devitalized epidermis and/or dermis, exudate, debris, biofilm), including topic. (04/26/2013)  Nonexcisional debridement of wound, infection or burn (04/26/2013)  multiple LE pta   Medications  Inpatient  acetaminophen, 650 mg= 2 tab(s), Oral, q6hr, PRN  acetaminophen, 1000 mg= 2 tab(s), Oral, q6hr, PRN  Aricept 10 mg oral tablet, 10 mg= 1 tab(s), Oral, Once a day (at bedtime)  Aspirin Enteric Coated, 81 mg= 1 tab(s), Oral, Daily  Kayexalate, 30 gm= 120 mL, Oral, Once  levothyroxine 100 mcg (0.1 mg) oral tablet, 100 mcg= 1 tab(s), Oral, Daily  lidocaine 1% injectable solution, 5 mL, Subcutaneous, Once  Lovenox, 30 mg= 0.3 mL, Subcutaneous, Daily  memantine 5 mg oral tablet, 5 mg= 1 tab(s), Oral, BID  NS (0.9% Sodium Chloride) Infusion 1,000 mL, 1000 mL, IV  oxcarbazepine 300 mg oral tablet, 300 mg= 1 tab(s), Oral, BID  Phenergan, 12.5 mg= 0.5 mL, IV Push, q4hr, PRN  Plavix 75 mg oral tablet, 75 mg= 1 tab(s), Oral, qPM  Proscar 5 mg oral tablet, 5 mg= 1 tab(s), Oral, Daily  Protonix 40 mg ORAL enteric coated tablet, 40 mg= 1 tab(s), Oral, Once  simvastatin 10 mg oral tablet, 10 mg= 1 tab(s),  Oral, qPM  Singulair 5 mg oral tablet, CHEWABLE, 10 mg= 2 tab(s), Oral, qPM  Sodium Chloride 0.9% PF vial (For PICC Flush), 10 mL, IV Push, As Directed, PRN  Sodium Chloride 0.9% PF vial (For PICC Flush), 20 mL, IV Push, As Directed, PRN  Sodium Chloride 0.9% PF vial (For PICC Flush), 10 mL, IV Push, As Directed  Zofran, 4 mg= 2 mL, IV Push, q4hr, PRN  Home  acetaminophen 325 mg oral capsule, See Instructions, PRN  albuterol-ipratropium 3mg-0.5mg/3ml NEB solution  Anoro Ellipta 62.5 mcg-25 mcg/inh inhalation powder, 1 puff(s), INH, Daily  Aricept 10 mg oral tablet, 10 mg= 1 tab(s), Oral, Once a day (at bedtime)  Aspirin Enteric Coated, 81 mg, Oral, Daily  CeraVe topical cream, 1 claudia, TOP, Daily, PRN  Flonase 50 mcg/inh nasal spray, 1 spray(s), Nasal, Daily  glucagon 1 mg injection, 1 mg= 1 EA, IM, q10min, PRN  hydrOXYzine hydrochloride 25 mg oral tablet, 25 mg= 1 tab(s), Oral, QID  ketoconazole 2% topical cream, 1 claudia, TOP, BID  levothyroxine 100 mcg (0.1 mg) oral tablet, 100 mcg= 1 tab(s), Oral, Daily  losartan 25 mg oral tablet, 25 mg= 1 tab(s), Oral, Daily  Namenda 5 mg oral tablet, 5 mg= 1 tab(s), Oral, BID  NovoLIN R 100 units/mL injectable solution, See Instructions, PRN  nystatin 100,000 units/g topical powder, 1 claudia, TOP, TID  omeprazole 20 mg oral DR capsule, 20 mg= 1 cap(s), Oral, Daily  oxcarbazepine 300 mg oral tablet, 300 mg= 1 tab(s), Oral, BID  Plavix 75 mg oral tablet, 75 mg= 1 tab(s), Oral, qPM  propranolol 10 mg oral tablet, 10 mg= 1 tab(s), Oral, Daily  Proscar 5 mg oral tablet, 5 mg= 1 tab(s), Oral, Daily  ranitidine 150 mg oral tablet, 150 mg= 1 tab(s), Oral, BID  simvastatin 10 mg oral tablet, 10 mg= 1 tab(s), Oral, qPM  Singulair 10 mg oral TABLET, 10 mg= 1 tab(s), Oral, qPM  Allergies  E-Mycin  MAGNUS (thromboangiitis obliterans).....  Zithromax  cortisone  doxycycline  neomycin  tetracycline  Social History  Abuse/Neglect  No, 09/30/2019  Alcohol - Denies Alcohol Use, 04/23/2012  Past,  09/30/2019  Employment/School  Retired, 01/25/2017  Exercise  Exercise frequency: Daily., 09/16/2019  Home/Environment  Nutrition/Health  Regular, Good, 09/16/2019  Substance Use  Past, 09/16/2019  Tobacco - Denies Tobacco Use, 04/23/2012  5-9 cigarettes (between 1/4 to 1/2 pack)/day in last 30 days, Cigarettes, Yes, 5 per day., 09/30/2019  Family History  Father: History is unknown  Mother: History is unknown  Immunizations  Vaccine Date Status   tetanus/diphtheria/pertussis, acel(Tdap) 02/23/2017 Given   Lab Results  Labs Last 24 Hours?  ?Hematology/Coagulation?   WBC: 4.8 x10(3)/mcL (09/30/19 19:19:00)   RBC:?3.44 x10(6)/mcL?Low (09/30/19 19:19:00)   Hgb:?9.4 gm/dL?Low (09/30/19 19:19:00)   Hct:?30.6 %?Low (09/30/19 19:19:00)   Platelet: 200 x10(3)/mcL (09/30/19 19:19:00)   MCV: 89 fL (09/30/19 19:19:00)   MCH: 27.3 pg (09/30/19 19:19:00)   MCHC:?30.7 gm/dL?Low (09/30/19 19:19:00)   RDW: 13.7 % (09/30/19 19:19:00)   MPV: 9.5 fL (09/30/19 19:19:00)   Abs Neut: 2.6 x10(3)/mcL (09/30/19 19:19:00)   Neutro Auto: 54 % (09/30/19 19:19:00)   Lymph Auto: 22 % (09/30/19 19:19:00)   Mono Auto: 9 % (09/30/19 19:19:00)   Eos Auto: 14 % (09/30/19 19:19:00)   Abs Eos: 0.7 x10(3)/mcL (09/30/19 19:19:00)   Basophil Auto: 0 % (09/30/19 19:19:00)   Abs Neutro: 2.6 x10(3)/mcL (09/30/19 19:19:00)   Abs Lymph: 1.1 x10(3)/mcL (09/30/19 19:19:00)   Abs Mono: 0.4 x10(3)/mcL (09/30/19 19:19:00)   Abs Baso: 0 x10(3)/mcL (09/30/19 19:19:00)   IG%: 1 % (09/30/19 19:19:00)   IG#: 0.03 (09/30/19 19:19:00)   Diagnostic Results  Accession:?[id number]  Order:?XR Chest 1 View  Report Dt/Tm:?09/30/2019 18:59  Report:?  PORTABLE CHEST X-RAY, ONE FRONTAL VIEW  ?  HISTORY: PICC Line Placement  ?  COMPARISON: 8/7/2019  ?  FINDINGS:  Left PICC tip projects over the SVC.  ?  No focal consolidations, pleural effusions or pneumothoraces.  Heart size within normal limits. Knob calcifications.  No acute bony pathology.  Surgical clips project over the  right upper abdomen.  Soft tissues within normal limits.  ???  IMPRESSION:  ?  No acute thoracic abnormality.  No significant interval change.  ?  ?      ?  Essentially, patient is a 89-year-old white male with past medical history of significant peripheral vascular disease s/p laser atherectomy PTA of the left SFA in 2014? ?Alzheimers disease, atypical psychosis, A. fib, BPH, resistant tinea cruris was apparently brought from the nursing home for acute renal failure. ?Patients creatinine was 2.5 and his baseline was around 1.6. At baseline, patient was unable to answer any questions and he was very difficult hearing. ?According to the documentations, patient has been tried on terbinafine 250 mg x 6 weeks but because patient is unable to answer any questions it is unknown if patient even started or completed this course. ?Well have to obtain records from the nursing home. After internal medicine was consulted, ED ordered blood cultures, lipase, UA, TSH, amylase level, CBC. ?Have placed order for PICC line. ?Spoke to Dr. De La Cruz, who recommended to hydrate patient prior to starting the micafungin through the PICC line. ?Have placed the order for PICC line for tonight. Will start IV micafungin once patient?s creatinine starts to trend down. At this time patient is likely dehydrated from decreased PO intake. Can attempt to discharge patient tomorrow with the help of case management if patient is accepted to an LTAC facility. ?ECHO, US retro-peritoneum ordered for tomorrow. ?Start IV maintenance depending on electrolytes at low rate given its patient?s age.?

## 2022-05-03 NOTE — HISTORICAL [facility]ER
This is a historical note converted from [facility]ena[facility]. Formatting and pictures may have been removed.  Please reference Zenaida for original formatting and attached multimedia. Reason for Consultation  R inguinal hernia  History of Present Illness  Limited history could be obtained from patient with dementia and hearing loss. Per chart review, patient is a 89yoM on plavix, active smoker,?lives at nursing home, admitted to medicine team for treatment of tinea cruris of the groin. Surgery consulted for evaluation?R inguinal hernia. Patient not complaining of nausea, vomiting, fever, chills, abdominal pain, or scrotal pain. No bowel movement recorded since hospital admission.  Physical Exam  Vitals & Measurements  T:?36.6? ?C (Oral)? TMIN:?36.3? ?C (Oral)? TMAX:?36.8? ?C (Oral)? HR:?50(Peripheral)? RR:?18? BP:?175/55? SpO2:?99%?  Gen - resting in bed, nad  Head - atraumatic, normocephalic  Pulm - normal wob, no respiratory distress on room air  Abd - soft, nd, nttp, no masses  Groin - red peeling skin across b/l groin, perineum, scrotum. large reducible R inguinal hernia, nttp  Ext - moves all spontaneously  Assessment/Plan  89yoM with reducible R inguinal hernia without evidence of obstruction. Patient is a poor surgical candidate given his age, medical comorbidities, smoking history, active skin infection of the groin and plavix use. There is no indication for urgent surgical intervention for this hernia and elective repair would not be recommended in this high risk patient. Please call back for further questions.  ?  Ruthann Morrison, [id number]   Problem List/Past Medical History  Ongoing  AD (Alzheimers disease)  Atrial fibrillation  CAD (coronary atherosclerotic disease)  Chronic ischemic heart disease  Claudication  Dementia  Diabetes mellitus type 2 in nonobese  Disorder of circulatory system  Esophageal reflux  Essential hypertension  Gastritis  HTN (hypertension)  Hyperlipidemia  PAD (peripheral artery disease)  Rest  pain  Scrotal hernia  Tobacco user  Historical  History of tobacco use  Hyperlipidemia  Hypertrophy of prostate without urinary obstruction  Hypothyroidism  MRSA  Nystagmus, unspecified  Psychosis  Procedure/Surgical History  Extraction of Left Foot Skin, External Approach (06/26/2017)  Extraction of Right Foot Skin, External Approach (06/26/2017)  Repair Face Skin, External Approach (02/23/2017)  Simple repair of superficial wounds of face, ears, eyelids, nose, lips and/or mucous membranes; 2.6 cm to 5.0 cm (02/23/2017)  Dilation of Left Femoral Artery, Percutaneous Approach (01/26/2017)  Dilation of Right Femoral Artery with Intraluminal Device, Percutaneous Approach (01/26/2017)  Extirpation of Matter from Left Femoral Artery, Percutaneous Approach (01/26/2017)  Extirpation of Matter from Right Femoral Artery, Percutaneous Approach (01/26/2017)  Fluoroscopy of Aorta and Bilateral Lower Extremity Arteries using Low Osmolar Contrast (01/26/2017)  Revascularization, endovascular, open or percutaneous, femoral, popliteal artery(s), unilateral; with atherectomy, includes angioplasty within the same vessel, when performed (01/26/2017)  Revascularization, endovascular, open or percutaneous, femoral, popliteal artery(s), unilateral; with transluminal stent placement(s) and atherectomy, includes angioplasty within the same vessel, when performed (01/26/2017)  Selective catheter placement, arterial system; each first order abdominal, pelvic, or lower extremity artery branch, within a vascular family (01/26/2017)  Dilation of Left Femoral Artery, Percutaneous Approach (06/08/2016)  Fluoroscopy of Abdominal Aorta using Low Osmolar Contrast (06/08/2016)  Fluoroscopy of Bilateral Renal Arteries using Low Osmolar Contrast (06/08/2016)  Fluoroscopy of Left Lower Extremity Arteries using Low Osmolar Contrast (06/08/2016)  Introduction of Other Therapeutic Substance into Peripheral Artery, Percutaneous Approach  (06/08/2016)  Insertion of Infusion Device into Superior Vena Cava, Percutaneous Approach (06/07/2016)  Dilation of Left Femoral Artery, Percutaneous Approach (12/17/2015)  Extirpation of Matter from Left Femoral Artery, Percutaneous Approach (12/17/2015)  Fluoroscopy of Bilateral Renal Arteries using Low Osmolar Contrast (12/17/2015)  Fluoroscopy of Left Lower Extremity Arteries using Low Osmolar Contrast (12/17/2015)  Fluoroscopy of Right Lower Extremity Arteries using Low Osmolar Contrast (12/17/2015)  Debridement (eg, high pressure waterjet with/without suction, sharp selective debridement with scissors, scalpel and forceps), open wound, (eg, fibrin, devitalized epidermis and/or dermis, exudate, debris, biofilm), including topic. (06/24/2013)  Nonexcisional debridement of wound, infection or burn (06/24/2013)  Debridement (eg, high pressure waterjet with/without suction, sharp selective debridement with scissors, scalpel and forceps), open wound, (eg, fibrin, devitalized epidermis and/or dermis, exudate, debris, biofilm), including topic. (04/26/2013)  Nonexcisional debridement of wound, infection or burn (04/26/2013)  multiple LE pta   Medications  Inpatient  acetaminophen, 650 mg= 2 tab(s), Oral, q6hr, PRN  Aricept 10 mg oral tablet, 10, Oral, Once a day (at bedtime)  Aspirin Enteric Coated, 81 mg= 1 tab(s), Oral, Daily  Dextrose 50% and Water (50 mL vial/syringe), 12.5 gm= 25 mL, IV Push, Once, PRN  Dextrose 50% and Water (50 mL vial/syringe), 12.5 gm= 25 mL, IV Push, As Directed, PRN  Dextrose 50% and Water (50 mL vial/syringe), 25 gm= 50 mL, IV Push, As Directed, PRN  Dextrose 50% in Water intravenous solution, 12.5 gm= 25 mL, IV Push, As Directed, PRN  DuoNeb 0.5 mg-2.5 mg/3 mL inhalation solution, 3 mL, NEB, q4hr, PRN  famotidine 20 mg oral tablet, 20 mg= 1 tab(s), Oral, Daily  glucagon recombinant 1 mg injection, 1 mg= 1 EA, IM, q10min, PRN  glucagon recombinant 1 mg injection, 1 mg= 1 EA, IM, q10min,  PRN  glucose 40% oral gel, 15 gm= 0.5 tube(s), Oral, As Directed, PRN  hydrALAZINE (Apresoline) Inj., 5 mg= 0.25 mL, IV Push, q2hr, PRN  insulin lispro 100 units/mL subcutaneous injection, 2-14 units, Subcutaneous, As Directed, PRN  levothyroxine 100 mcg (0.1 mg) oral tablet, 100 mcg= 1 tab(s), Oral, Daily  losartan 25 mg oral tablet, 25 mg= 0.5 tab(s), Oral, Daily  Lovenox, 30 mg= 0.3 mL, Subcutaneous, Daily  memantine 5 mg oral tablet, 5 mg= 1 tab(s), Oral, Daily  micafungin  miconazole 2% topical powder, 1 claudia, TOP, BID  oxcarbazepine 300 mg oral tablet, 300 mg= 1 tab(s), Oral, BID  Plavix 75 mg oral tablet, 75 mg= 1 tab(s), Oral, qPM  propranolol 10 mg oral tablet, 10 mg= 1 tab(s), Oral, Daily  Proscar 5 mg oral tablet, 5 mg= 1 tab(s), Oral, Daily  simvastatin 10 mg oral tablet, 10 mg= 1 tab(s), Oral, qPM  Singulair 5 mg oral tablet, CHEWABLE, 10 mg= 2 tab(s), Oral, qPM  Zofran, 4 mg= 2 mL, IV Push, q4hr, PRN  Home  acetaminophen 325 mg oral capsule, See Instructions, PRN  albuterol-ipratropium 3mg-0.5mg/3ml NEB solution  Anoro Ellipta 62.5 mcg-25 mcg/inh inhalation powder, 1 puff(s), INH, Daily  Aricept 10 mg oral tablet, 10 mg= 1 tab(s), Oral, Once a day (at bedtime)  Aspirin Enteric Coated, 81 mg, Oral, Daily  CeraVe topical cream, 1 claudia, TOP, Daily, PRN  Flonase 50 mcg/inh nasal spray, 1 spray(s), Nasal, Daily  glucagon 1 mg injection, 1 mg= 1 EA, IM, q10min, PRN  hydrOXYzine hydrochloride 25 mg oral tablet, 25 mg= 1 tab(s), Oral, QID  levothyroxine 100 mcg (0.1 mg) oral tablet, 100 mcg= 1 tab(s), Oral, Daily  losartan 25 mg oral tablet, 25 mg= 1 tab(s), Oral, Daily  Namenda 5 mg oral tablet, 5 mg= 1 tab(s), Oral, BID  NovoLIN R, Subcutaneous, QIDACHS  NovoLIN R 100 units/mL injectable solution, See Instructions, PRN  omeprazole 20 mg oral DR capsule, 20 mg= 1 cap(s), Oral, Daily  oxcarbazepine 300 mg oral tablet, 300 mg= 1 tab(s), Oral, BID  Plavix 75 mg oral tablet, 75 mg= 1 tab(s), Oral, qPM  propranolol  10 mg oral tablet, 10 mg= 1 tab(s), Oral, Daily  Proscar 5 mg oral tablet, 5 mg= 1 tab(s), Oral, Daily  ranitidine 150 mg oral tablet, 150 mg= 1 tab(s), Oral, BID  simvastatin 10 mg oral tablet, 10 mg= 1 tab(s), Oral, qPM  Singulair 10 mg oral TABLET, 10 mg= 1 tab(s), Oral, qPM  Vistaril 25 mg oral capsule, 25 mg= 1 cap(s), Oral, q6hr, PRN  Allergies  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  ?Chemistry? Hematology/Coagulation?   Sodium Lvl: 142 mmol/L (08/08/19 04:37:00) WBC: 4.8 x10(3)/mcL (08/08/19 04:37:00)   Potassium Lvl: 4.5 mmol/L (08/08/19 04:37:00) RBC:?3.52 x10(6)/mcL?Low (08/08/19 04:37:00)   Chloride:?109 mmol/L?High (08/08/19 04:37:00) Hgb:?9.3 gm/dL?Low (08/08/19 04:37:00)   CO2: 29 mmol/L (08/08/19 04:37:00) Hct:?30.2 %?Low (08/08/19 04:37:00)   Calcium Lvl: 8.5 mg/dL (08/08/19 04:37:00) Platelet: 169 x10(3)/mcL (08/08/19 04:37:00)   Glucose Lvl: 85 mg/dL (08/08/19 04:37:00) MCV: 85.8 fL (08/08/19 04:37:00)   BUN:?32 mg/dL?High (08/08/19 04:37:00) MCH: 26.4 pg (08/08/19 04:37:00)   Creatinine:?1.6 mg/dL?High (08/08/19 04:37:00) MCHC:?30.8 gm/dL?Low (08/08/19 04:37:00)   eGFR-AA:?52 mL/min?Low (08/08/19 04:37:00) RDW: 13.4 % (08/08/19 04:37:00)   eGFR-ROBBIE:?43 mL/min?Low (08/08/19 04:37:00) MPV: 9.1 fL (08/08/19 04:37:00)   Bili Total: 0.3 mg/dL (08/08/19 04:37:00) Abs Neut: 2.62 x10(3)/mcL (08/08/19 04:37:00)   Bili Direct: 0.1 mg/dL (08/08/19 04:37:00) Neutro Auto: 55 x10(3)/mcL (08/08/19  04:37:00)   Bili Indirect: 0.2 mg/dL (08/08/19 04:37:00) Lymph Auto: 21 % (08/08/19 04:37:00)   AST: 16 unit/L (08/08/19 04:37:00) Mono Auto: 9 % (08/08/19 04:37:00)   ALT: 14 unit/L (08/08/19 04:37:00) Eos Auto: 15 (08/08/19 04:37:00)   Alk Phos: 60 unit/L (08/08/19 04:37:00) Abs Eos: 0.73 x10(3)/mcL (08/08/19 04:37:00)   Total Protein: 6.8 gm/dL (08/08/19 04:37:00) Basophil Auto: 0 (08/08/19 04:37:00)   Albumin Lvl:?3.2 gm/dL?Low (08/08/19 04:37:00) Abs Neutro: 2.62 x10(3)/mcL (08/08/19 04:37:00)   Globulin:?3.6 gm/mL?High (08/08/19 04:37:00) Abs Lymph: 0.99 x10(3)/mcL (08/08/19 04:37:00)   A/G Ratio:?0.9 ratio?Low (08/08/19 04:37:00) Abs Mono: 0.42 x10(3)/mcL (08/08/19 04:37:00)    Abs Baso: 0.02 x10(3)/mcL (08/08/19 04:37:00)       I agree with resident documentation. I was physically present, supervised resident, ?and discussed plan of care.

## 2022-05-03 NOTE — HISTORICAL [facility] CERNER
This is a historical note converted from [employer]. Formatting and pictures may have been removed.  Please reference [employer] for original formatting and attached multimedia. Chief Complaint  weakness x3 days; fell yesterday at 1315; lac to back of head, on plavix  History of Present Illness  Patient is a 89-year-old male with a past medical history of Alzheimers dementia, hearing loss, atrial fibrillation (not on anticoagulation), hypothyroidism, CAD, diabetes mellitus, PAD, and additional past medical history as below who was brought into the nursing home for progressive generalized weakness.?Dacia harry his current nursing home reported that over the last 3 days hes become progressively weak with inability to ambulate, and at baseline is usually ambulatory.? He did have a fall yesterday and suffered a small laceration to the back of his head.? On arrival to the ER he was noted to be slightly hypertensive, mildly bradycardic with a heart rate in the 50s, and afebrile.? Laboratory work only significant for chronic anemia which appears stable.? CT of his head and cervical spine were obtained in arrival and showed no acute process.? Chest x-ray noted no acute process as well.? EKG on arrival showed sinus bradycardia, nonspecific intraventricular block.? Patient was noted in the ER to have persistent tinea cruris which of note patient has undergone multiple rounds of treatment and just finished home IV antibiotics with micafungin on 10/15/2019, for a total of a 14 day course.? He is being committed to the hospitalist service for generalized weakness of unclear etiology. ?He was given IV fluids in the ER is concern for dehydration as a cause of his generalized weakness.  Review of Systems  General_negative except as stated above  HEENT_negative except as stated above  Neck_negative except as stated above  Respiratory_negative except as stated above  Cardiovascular_negative except as stated above  Gastrointestinal_negative  except as stated above  Genitourinary_negative except as stated above  Musculoskeletal_negative except as stated above  Immunologic_negative except as stated above  Neurologic_negative except as stated above  Psychiatric_negative except as stated above  Physical Exam  Vitals & Measurements  T:?36.8? ?C (Oral)? TMIN:?36.5? ?C (Temporal Artery)? TMAX:?36.8? ?C (Oral)? HR:?87(Peripheral)? RR:?15? RR:?15? BP:?168/70? SpO2:?98%? SpO2:?98%? WT:?74.4?kg?  GENERAL: awake, alert to self and location, oriented and in no acute distress  HEENT: normocephalic, small post scalp lac with steristrips , [place]  NECK: supple?  LUNGS: CTA B/L, no rales or rhonchi, moving air well without resp distress  CVS: Regular rate and rhythm, normal peripheral perfusion  ABD: Soft, non-tender, non-distended, bowel sounds present  EXTREMITIES: no clubbing or cyanosis  SKIN: Warm, dry.??Erythematous plaque involving the entire groin area, no evidence of purulent drainage  NEURO: alert and oriented, grossly without focal deficits  PSYCHIATRIC: Cooperative  ?  Assessment/Plan  Generalized weakness  Mechanical fall yesterday?with head laceration, unremarkable CT head/c-spine  Small scalp laceration secondary to above  Recent completion of IV micafungin for tinea cruris (completed 10/15)  Alzheimers dementia  ?  - continue gentle IVFs overnight  - Fall precautions  - hold home sedating medications including Hydroxyzine and oxcarbazepine for tonight  - Physical therapy consultation  - continue topical care for tinea cruris  ?  DVT prophylaxis: SCDs  CODE STATUS: DNR  PCP:?BINA Baker MD   Problem List/Past Medical History  Ongoing  AD (Alzheimers disease)  Atrial fibrillation  CAD (coronary atherosclerotic disease)  Chronic ischemic heart disease  Claudication  Dementia  Diabetes mellitus type 2 in nonobese  Disorder of circulatory system  Esophageal reflux  Essential hypertension  Gastritis  HTN (hypertension)  Hyperlipidemia  PAD (peripheral artery  disease)  Rest pain  Scrotal hernia  Tobacco user  Historical  History of tobacco use  Hyperlipidemia  Hypertrophy of prostate without urinary obstruction  Hypothyroidism  MRSA  Nystagmus, unspecified  Psychosis  Procedure/Surgical History  Insertion of Infusion Device into Superior Vena Cava, Percutaneous Approach (09/30/2019)  Insertion of peripherally inserted central venous catheter (PICC), without subcutaneous port or pump, including all imaging guidance, image documentation, and all associated radiological supervision and interpretation required to perform the insertion; ag (09/30/2019)  Ultrasonography of Superior Vena Cava, Guidance (09/30/2019)  Excision of Left Upper Leg Skin, External Approach, Diagnostic (08/07/2019)  Punch biopsy of skin (including simple closure, when performed); each separate/additional lesion (List separately in addition to code for primary procedure) (08/07/2019)  Punch biopsy of skin (including simple closure, when performed); single lesion (08/07/2019)  Extraction of Left Foot Skin, External Approach (06/26/2017)  Extraction of Right Foot Skin, External Approach (06/26/2017)  Repair Face Skin, External Approach (02/23/2017)  Simple repair of superficial wounds of face, ears, eyelids, nose, lips and/or mucous membranes; 2.6 cm to 5.0 cm (02/23/2017)  Dilation of Left Femoral Artery, Percutaneous Approach (01/26/2017)  Dilation of Right Femoral Artery with Intraluminal Device, Percutaneous Approach (01/26/2017)  Extirpation of Matter from Left Femoral Artery, Percutaneous Approach (01/26/2017)  Extirpation of Matter from Right Femoral Artery, Percutaneous Approach (01/26/2017)  Fluoroscopy of Aorta and Bilateral Lower Extremity Arteries using Low Osmolar Contrast (01/26/2017)  Revascularization, endovascular, open or percutaneous, femoral, popliteal artery(s), unilateral; with atherectomy, includes angioplasty within the same vessel, when performed (01/26/2017)  Revascularization,  endovascular, open or percutaneous, femoral, popliteal artery(s), unilateral; with transluminal stent placement(s) and atherectomy, includes angioplasty within the same vessel, when performed (01/26/2017)  Selective catheter placement, arterial system; each first order abdominal, pelvic, or lower extremity artery branch, within a vascular family (01/26/2017)  Dilation of Left Femoral Artery, Percutaneous Approach (06/08/2016)  Fluoroscopy of Abdominal Aorta using Low Osmolar Contrast (06/08/2016)  Fluoroscopy of Bilateral Renal Arteries using Low Osmolar Contrast (06/08/2016)  Fluoroscopy of Left Lower Extremity Arteries using Low Osmolar Contrast (06/08/2016)  Introduction of Other Therapeutic Substance into Peripheral Artery, Percutaneous Approach (06/08/2016)  Insertion of Infusion Device into Superior Vena Cava, Percutaneous Approach (06/07/2016)  Dilation of Left Femoral Artery, Percutaneous Approach (12/17/2015)  Extirpation of Matter from Left Femoral Artery, Percutaneous Approach (12/17/2015)  Fluoroscopy of Bilateral Renal Arteries using Low Osmolar Contrast (12/17/2015)  Fluoroscopy of Left Lower Extremity Arteries using Low Osmolar Contrast (12/17/2015)  Fluoroscopy of Right Lower Extremity Arteries using Low Osmolar Contrast (12/17/2015)  Debridement (eg, high pressure waterjet with/without suction, sharp selective debridement with scissors, scalpel and forceps), open wound, (eg, fibrin, devitalized epidermis and/or dermis, exudate, debris, biofilm), including topic. (06/24/2013)  Nonexcisional debridement of wound, infection or burn (06/24/2013)  Debridement (eg, high pressure waterjet with/without suction, sharp selective debridement with scissors, scalpel and forceps), open wound, (eg, fibrin, devitalized epidermis and/or dermis, exudate, debris, biofilm), including topic. (04/26/2013)  Nonexcisional debridement of wound, infection or burn (04/26/2013)  multiple LE pta    Medications  Inpatient  acetaminophen, 1000 mg= 2 tab(s), Oral, q6hr, PRN  [id number] 1,000 mL, 1000 mL, IV  NS 1,000 mL, 1000 mL, IV  Zofran, 4 mg= 2 mL, IV Push, q4hr, PRN  Home  acetaminophen 325 mg oral capsule, See Instructions, PRN  albuterol-ipratropium 2.5 mg-0.5 mg/3 mL inhalation solution, 3 mL, NEB, QID  albuterol-ipratropium 3mg-0.5mg/3ml NEB solution,? ?Not Taking, Completed Rx  Anoro Ellipta 62.5 mcg-25 mcg/inh inhalation powder, 1 puff(s), INH, Daily  Aricept 10 mg oral tablet, 10 mg= 1 tab(s), Oral, Once a day (at bedtime)  Aspirin Enteric Coated, 81 mg, Oral, Daily  betamethasone valerate 0.1% topical cream, 1 claudia, TOP, BID  betamethasone-clotrimazole 0.05%-1% topical cream, 1 claudia, TOP, BID  cefdinir 300 mg oral capsule, 300 mg= 1 cap(s), Oral, BID,? ?Not Taking, Completed Rx  CeraVe topical cream, 1 claudia, TOP, Daily, PRN  Flonase 50 mcg/inh nasal spray, 1 spray(s), Nasal, Daily  fluconazole 100 mg oral tablet, 100 mg= 1 tab(s), Oral, Daily  glucagon 1 mg injection, 1 mg= 1 EA, IM, q10min, PRN  hydrOXYzine hydrochloride 25 mg oral tablet, 25 mg= 1 tab(s), Oral, QID, PRN  hydrOXYzine pamoate 25 mg oral capsule, 25 mg= 1 cap(s), Oral, QID  ketoconazole 2% topical cream, 1 claudia, TOP, BID  levothyroxine 100 mcg (0.1 mg) oral tablet, 100 mcg= 1 tab(s), Oral, Daily  losartan 25 mg oral tablet, 25 mg= 1 tab(s), Oral, Daily,? ?Not Taking per Prescriber  methylPREDNISolone 4 mg oral tab,? ?Not taking  metoprolol succinate 25 mg oral tablet, extended release, 25 mg= 1 tab(s), Oral, Daily,? ?Not taking  Namenda 5 mg oral tablet, 5 mg= 1 tab(s), Oral, BID  NovoLIN R 100 units/mL injectable solution, See Instructions, PRN  nystatin 100,000 units/g topical powder, 1 claudia, TOP, TID,? ?Not taking  omeprazole 20 mg oral DR capsule, 20 mg= 1 cap(s), Oral, Daily  oxcarbazepine 300 mg oral tablet, 300 mg= 1 tab(s), Oral, BID  Plavix 75 mg oral tablet, 75 mg= 1 tab(s), Oral, qPM  propranolol 10 mg oral tablet, 10 mg= 1  tab(s), Oral, Daily,? ?Not Taking per Prescriber  Proscar 5 mg oral tablet, 5 mg= 1 tab(s), Oral, Daily  ranitidine 150 mg oral tablet, 150 mg= 1 tab(s), Oral, Every other day  simvastatin 10 mg oral tablet, 10 mg= 1 tab(s), Oral, qPM  Singulair 10 mg oral TABLET, 10 mg= 1 tab(s), Oral, qPM  tamsulosin 0.4 mg oral capsule, 0.4 mg= 1 cap(s), Oral, Daily,? ?Not taking  terbinafine 250 mg oral tablet, 250 mg= 1 tab(s), Oral, Daily,? ?Not taking  Vitamin D3 5000 intl units oral tablet, 5000 IntUnit= 1 tab(s), Oral, Daily  Allergies  E-Mycin  MAGNUS (thromboangiitis obliterans).....  Zithromax  cortisone  doxycycline  neomycin  tetracycline  Social History  Alcohol - Denies Alcohol Use, 04/23/2012  Past, 09/30/2019  Employment/School  Retired, 01/25/2017  Exercise  Exercise frequency: Daily., 09/16/2019  Home/Environment  Nutrition/Health  Regular, Good, 09/16/2019  Substance Use  Past, 09/16/2019  Tobacco - Denies Tobacco Use, 04/23/2012  5-9 cigarettes (between 1/4 to 1/2 pack)/day in last 30 days, No, 10/17/2019  5-9 cigarettes (between 1/4 to 1/2 pack)/day in last 30 days, No, Household tobacco concerns: No., 10/15/2019  Family History  Father: History is unknown  Mother: History is unknown  Immunizations  Vaccine Date Status   tetanus/diphtheria/pertussis, acel(Tdap) 02/23/2017 Given   Lab Results  Chemistry? Hematology/Coagulation? Blood Gases?   POC Sodium: 143 mmol/L (10/17/19 12:15:00) PT:?14.3 second(s)?High (10/17/19 12:09:00) POC TCO2: 25 mmol/L (10/17/19 12:15:00)   POC Potassium: 4 mmol/L (10/17/19 12:15:00) INR: 1.1 (10/17/19 12:09:00)    POC Chloride: 106 mmol/L (10/17/19 12:15:00) PTT: 30.8 second(s) (10/17/19 12:09:00)    POC Ion Calcium: 1.19 mmol/L (10/17/19 12:15:00) WBC:?4.3 x10(3)/mcL?Low (10/17/19 12:09:00)    POC Glucose:?106 mg/dL?High (10/17/19 12:15:00) RBC:?3.97 x10(6)/mcL?Low (10/17/19 12:09:00)    POC BUN:?31 mg/dL?High (10/17/19 12:15:00) Hgb:?10.7 gm/dL?Low (10/17/19 12:09:00)    POC  Creatinine:?1.6 mg/dL?High (10/17/19 12:15:00) POC Hb:?11.6 mg/dL?Low (10/17/19 12:15:00)    Lactic Acid Lvl: 1.1 mmol/L (10/17/19 13:58:00) Hct:?34.3 %?Low (10/17/19 12:09:00)    POC AGAP: 18 (10/17/19 12:15:00) POC Hct:?34 %?Low (10/17/19 12:15:00)     Platelet: 184 x10(3)/mcL (10/17/19 12:09:00)     MCV: 86.4 fL (10/17/19 12:09:00)     MCH: 27 pg (10/17/19 12:09:00)     MCHC:?31.2 gm/dL?Low (10/17/19 12:09:00)     RDW: 13.3 % (10/17/19 12:09:00)     MPV:?8.8 fL?Low (10/17/19 12:09:00)     Abs Neut: 2.46 x10(3)/mcL (10/17/19 12:09:00)     Neutro Auto: 57 % (10/17/19 12:09:00)     Lymph Auto: 22 % (10/17/19 12:09:00)     Mono Auto: 10 % (10/17/19 12:09:00)     Eos Auto: 11 % (10/17/19 12:09:00)     Abs Eos: 0.5 x10(3)/mcL (10/17/19 12:09:00)     Basophil Auto: 1 % (10/17/19 12:09:00)     Abs Neutro: 2.46 x10(3)/mcL (10/17/19 12:09:00)     Abs Lymph: 1 x10(3)/mcL (10/17/19 12:09:00)     Abs Mono: 0.4 x10(3)/mcL (10/17/19 12:09:00)     Abs Baso: 0 x10(3)/mcL (10/17/19 12:09:00)    Diagnostic Results  Accession:?[id number]  Order:?XR Chest 1 View  Report Dt/Tm:?10/17/2019 13:19  Report:?  History:  Weakness  ?  Reference:  30 September 2019  ?  Findings:  Portable frontal view of the chest was obtained. Stable cardiomegaly  with aortic atherosclerosis. Grossly clear lungs. No pneumothorax.  ?  Impression:?  No acute findings. Stable chest radiograph.  ?  ?  Accession:?[id number]  Order:?CT Cervical Spine W/O Contrast  Report Dt/Tm:?10/17/2019 13:06  Report:?  History: Trauma  Comparison studies:  None  ?  Technique:?  Axial CT images were obtained through the cervical region.  Coronal and sagittal reconstructions obtained from the axial data.  Automatic exposure control was utilized to limit radiation dose.  Contrast: None.  ?  Radiation Dose:?  Total DLP: 564 mGy*cm  ?  DISCUSSION:  ?  Fractures: None.  Alignment: Straightening of the normal lordosis. No subluxations.  Grade 1 anterolisthesis of C2 on C3, C3 on  C4, and C5 on C6.  Soft tissues: No abnormalities.  ?  Degenerative changes:  Mild degenerative canal stenosis at C2-3, moderate from C3 through C6,  severe at C6-7 related to disc osteophyte complexes.  Mild right and moderate left foraminal narrowing at C2-3, severe  bilaterally at C3-4 mild bilaterally at C4-5, mild on the left at C5-6  and mild on the right at C6-7 related to uncovertebral and facet  hypertrophy.  ?  IMPRESSION:  ?  1. No fractures.  ?  2. Ligament, spinal cord and/or vascular abnormalities cannot be  excluded on the basis of this examination.  ?  ?  Accession:?[id number]  Order:?CT Head W/O Contrast  Report Dt/Tm:?10/17/2019 12:57  Report:?  EXAM: CT HEAD WITHOUT CONTRAST  ?  History: fall - on plavix  ?  Comparison studies: CT head dated 6/23/2017  ?  Technique:?  Axial scans were obtained from skull base to the vertex.  Coronal and sagittal reconstructions obtained from the axial data.?  Automatic exposure control was utilized to limit radiation dose.  Contrast: None  ?  Radiation Dose:  Total DLP: 1375 mGy*cm  ?  DISCUSSION:  ?  There is no acute intracranial hemorrhage or edema. There are patchy  periventricular and deep white matter hypodensities. There is a  hypodensity in the right basal ganglia. The gray-white matter  differentiation is preserved.  ?  There is no mass effect or midline shift. The ventricles and sulci are  normal in size. The basal cisterns are patent. There is no abnormal  extra-axial fluid collection.  ?  The calvarium and skull base are intact. The visualized paranasal  sinuses are clear. There is a small amount of fluid in the right  mastoid air cells.  ?  ?  IMPRESSION:  No acute intracranial abnormality.  ?  ?

## 2022-05-03 NOTE — HISTORICAL [facility] CERNER
This is a historical note converted from [facility]. Formatting and pictures may have been removed.  Please reference Zenaida for original formatting and attached multimedia. Chief Complaint  Tinea cruris.  Reason for Consultation  Failure of outpatient tinea cruris therapy.  History of Present Illness  Mr. Mendoza is a 89 year old male nursing home patient with PMHx of Alzheimers?dementia?presenting with treatment resistant tinea cruris.?At the nursing home he completed a course of Ketoconazole cream and Calmoseptine with no improvement. Also failed outpatient Terbinafine 250mg 1 po q?day x 14 days. Pt?has difficulties communicating due to hearing?loss and?dementia.?Denies?pain, itching, fever or chills. ID consulted due to failure of outpatient tinea cruris therapy.  Review of Systems  Negative except as stated in HPI. Limited due to patients clinical condition.  Physical Exam  Vitals & Measurements  T:?36.6? ?C (Oral)? TMIN:?36.3? ?C (Oral)? TMAX:?36.8? ?C (Oral)? HR:?50(Peripheral)? RR:?18? BP:?175/55? SpO2:?99%?  GEN: No acute distress, afebrile.  HEART: S1, S2, no appreciable murmur, regular rate and rhythm.  LUNGS: Clear to auscultation bilaterally.  ABD: BS+, soft, non tender.  EXT: Warm, no LE edema.  SKIN: Improving erythema and cracking skin in groin area extending down bilateral inner thighs.  ?   Labs Last 24 Hours?  ?Chemistry? Hematology/Coagulation?   Sodium Lvl: 142 mmol/L (08/08/19 04:37:00) WBC: 4.8 x10(3)/mcL (08/08/19 04:37:00)   Potassium Lvl: 4.5 mmol/L (08/08/19 04:37:00) RBC:?3.52 x10(6)/mcL?Low (08/08/19 04:37:00)   Chloride:?109 mmol/L?High (08/08/19 04:37:00) Hgb:?9.3 gm/dL?Low (08/08/19 04:37:00)   CO2: 29 mmol/L (08/08/19 04:37:00) Hct:?30.2 %?Low (08/08/19 04:37:00)   Calcium Lvl: 8.5 mg/dL (08/08/19 04:37:00) Platelet: 169 x10(3)/mcL (08/08/19 04:37:00)   Glucose Lvl: 85 mg/dL (08/08/19 04:37:00) MCV: 85.8 fL (08/08/19 04:37:00)   BUN:?32 mg/dL?High (08/08/19 04:37:00) MCH: 26.4 pg (08/08/19  04:37:00)   Creatinine:?1.6 mg/dL?High (08/08/19 04:37:00) MCHC:?30.8 gm/dL?Low (08/08/19 04:37:00)   eGFR-AA:?52 mL/min?Low (08/08/19 04:37:00) RDW: 13.4 % (08/08/19 04:37:00)   eGFR-ROBBIE:?43 mL/min?Low (08/08/19 04:37:00) MPV: 9.1 fL (08/08/19 04:37:00)   Bili Total: 0.3 mg/dL (08/08/19 04:37:00) Abs Neut: 2.62 x10(3)/mcL (08/08/19 04:37:00)   Bili Direct: 0.1 mg/dL (08/08/19 04:37:00) Neutro Auto: 55 x10(3)/mcL (08/08/19 04:37:00)   Bili Indirect: 0.2 mg/dL (08/08/19 04:37:00) Lymph Auto: 21 % (08/08/19 04:37:00)   AST: 16 unit/L (08/08/19 04:37:00) Mono Auto: 9 % (08/08/19 04:37:00)   ALT: 14 unit/L (08/08/19 04:37:00) Eos Auto: 15 (08/08/19 04:37:00)   Alk Phos: 60 unit/L (08/08/19 04:37:00) Abs Eos: 0.73 x10(3)/mcL (08/08/19 04:37:00)   Total Protein: 6.8 gm/dL (08/08/19 04:37:00) Basophil Auto: 0 (08/08/19 04:37:00)   Albumin Lvl:?3.2 gm/dL?Low (08/08/19 04:37:00) Abs Neutro: 2.62 x10(3)/mcL (08/08/19 04:37:00)   Globulin:?3.6 gm/mL?High (08/08/19 04:37:00) Abs Lymph: 0.99 x10(3)/mcL (08/08/19 04:37:00)   A/G Ratio:?0.9 ratio?Low (08/08/19 04:37:00) Abs Mono: 0.42 x10(3)/mcL (08/08/19 04:37:00)   ? Abs Baso: 0.02 x10(3)/mcL (08/08/19 04:37:00)   ?  Assessment/Plan  1. Tinea cruris  - Erythema?with cracking skin?to pubis, groin, and anal area.  - [place] biopsy collected 8/7 sent for pathology and fungal cultures.  - Improving on IV micafungin 100 mg q24hr and topical miconazole powder.  Recommendations: Continue micafungin 100 mg q24hr and topical miconazole applied BID.  ?   ID ATTENDING ADDENDUM TO MEDICAL STUDENT NOTE ABOVE  Patient seen and examined with Medical Student. Lab and imaging review, physical examination and medication reconciliation performed personally by me.?  ?   Subjective:  ID consulted to den benitez candidal intertrigo in this 89 yo male  Impressive disease, though better today, he has no complaints.? Remains poor historian secondary to sensorineural hearing loss  Afebrile  Tolerating  micafungin well  Tolerated punch bx and scraping well, awaiting micro and path  ?   Objective:  PE: Gen: AAOx3 in nad, comfortable  HEENT: no thrush  Neck: supple  CVS: RRR no m/r/g  C/L: CTA bilaterally  Abd: soft, nt/nd  Ext: no c/c/e  Skin: Impressive candidal intertrigo improved from yesterday  ?   Labs reviewed, imaging reviewed, physician notes reviewed  ?   ROS: ?Full 14 point ROS performed, all negative except as mentioned in HPI  ?   Impression:  Tinea Cruris  Inguinal hernia  Fungal skin infection  ?   Plan:  -Continue micafungin, will likely be able to transition to oral and d/c back to NH tomorrow on same if continues to improve.? Noted surgery consult.? awaiting punch bx path and culture.  ?  MD Xiomy  ID Staff?   Problem List/Past Medical History  Ongoing  AD (Alzheimers disease)  Atrial fibrillation  CAD (coronary atherosclerotic disease)  Chronic ischemic heart disease  Claudication  Dementia  Diabetes mellitus type 2 in nonobese  Disorder of circulatory system  Esophageal reflux  Essential hypertension  Gastritis  HTN (hypertension)  Hyperlipidemia  PAD (peripheral artery disease)  Rest pain  Scrotal hernia  Tobacco user  Historical  History of tobacco use  Hyperlipidemia  Hypertrophy of prostate without urinary obstruction  Hypothyroidism  MRSA  Nystagmus, unspecified  Psychosis  Procedure/Surgical History  Extraction of Left Foot Skin, External Approach (06/26/2017)  Extraction of Right Foot Skin, External Approach (06/26/2017)  Repair Face Skin, External Approach (02/23/2017)  Simple repair of superficial wounds of face, ears, eyelids, nose, lips and/or mucous membranes; 2.6 cm to 5.0 cm (02/23/2017)  Dilation of Left Femoral Artery, Percutaneous Approach (01/26/2017)  Dilation of Right Femoral Artery with Intraluminal Device, Percutaneous Approach (01/26/2017)  Extirpation of Matter from Left Femoral Artery, Percutaneous Approach (01/26/2017)  Extirpation of Matter from Right Femoral Artery,  Percutaneous Approach (01/26/2017)  Fluoroscopy of Aorta and Bilateral Lower Extremity Arteries using Low Osmolar Contrast (01/26/2017)  Revascularization, endovascular, open or percutaneous, femoral, popliteal artery(s), unilateral; with atherectomy, includes angioplasty within the same vessel, when performed (01/26/2017)  Revascularization, endovascular, open or percutaneous, femoral, popliteal artery(s), unilateral; with transluminal stent placement(s) and atherectomy, includes angioplasty within the same vessel, when performed (01/26/2017)  Selective catheter placement, arterial system; each first order abdominal, pelvic, or lower extremity artery branch, within a vascular family (01/26/2017)  Dilation of Left Femoral Artery, Percutaneous Approach (06/08/2016)  Fluoroscopy of Abdominal Aorta using Low Osmolar Contrast (06/08/2016)  Fluoroscopy of Bilateral Renal Arteries using Low Osmolar Contrast (06/08/2016)  Fluoroscopy of Left Lower Extremity Arteries using Low Osmolar Contrast (06/08/2016)  Introduction of Other Therapeutic Substance into Peripheral Artery, Percutaneous Approach (06/08/2016)  Insertion of Infusion Device into Superior Vena Cava, Percutaneous Approach (06/07/2016)  Dilation of Left Femoral Artery, Percutaneous Approach (12/17/2015)  Extirpation of Matter from Left Femoral Artery, Percutaneous Approach (12/17/2015)  Fluoroscopy of Bilateral Renal Arteries using Low Osmolar Contrast (12/17/2015)  Fluoroscopy of Left Lower Extremity Arteries using Low Osmolar Contrast (12/17/2015)  Fluoroscopy of Right Lower Extremity Arteries using Low Osmolar Contrast (12/17/2015)  Debridement (eg, high pressure waterjet with/without suction, sharp selective debridement with scissors, scalpel and forceps), open wound, (eg, fibrin, devitalized epidermis and/or dermis, exudate, debris, biofilm), including topic. (06/24/2013)  Nonexcisional debridement of wound, infection or burn (06/24/2013)  Debridement (eg,  high pressure waterjet with/without suction, sharp selective debridement with scissors, scalpel and forceps), open wound, (eg, fibrin, devitalized epidermis and/or dermis, exudate, debris, biofilm), including topic. (04/26/2013)  Nonexcisional debridement of wound, infection or burn (04/26/2013)  multiple LE pta   Medications  Inpatient  acetaminophen, 650 mg= 2 tab(s), Oral, q6hr, PRN  Aricept 10 mg oral tablet, 10, Oral, Once a day (at bedtime)  Aspirin Enteric Coated, 81 mg= 1 tab(s), Oral, Daily  Dextrose 50% and Water (50 mL vial/syringe), 12.5 gm= 25 mL, IV Push, Once, PRN  Dextrose 50% and Water (50 mL vial/syringe), 12.5 gm= 25 mL, IV Push, As Directed, PRN  Dextrose 50% and Water (50 mL vial/syringe), 25 gm= 50 mL, IV Push, As Directed, PRN  Dextrose 50% in Water intravenous solution, 12.5 gm= 25 mL, IV Push, As Directed, PRN  DuoNeb 0.5 mg-2.5 mg/3 mL inhalation solution, 3 mL, NEB, q4hr, PRN  famotidine 20 mg oral tablet, 20 mg= 1 tab(s), Oral, Daily  glucagon recombinant 1 mg injection, 1 mg= 1 EA, IM, q10min, PRN  glucagon recombinant 1 mg injection, 1 mg= 1 EA, IM, q10min, PRN  glucose 40% oral gel, 15 gm= 0.5 tube(s), Oral, As Directed, PRN  hydrALAZINE (Apresoline) Inj., 5 mg= 0.25 mL, IV Push, q2hr, PRN  insulin lispro 100 units/mL subcutaneous injection, 2-14 units, Subcutaneous, As Directed, PRN  levothyroxine 100 mcg (0.1 mg) oral tablet, 100 mcg= 1 tab(s), Oral, Daily  losartan 25 mg oral tablet, 25 mg= 0.5 tab(s), Oral, Daily  Lovenox, 30 mg= 0.3 mL, Subcutaneous, Daily  memantine 5 mg oral tablet, 5 mg= 1 tab(s), Oral, Daily  micafungin  miconazole 2% topical powder, 1 claudia, TOP, BID  oxcarbazepine 300 mg oral tablet, 300 mg= 1 tab(s), Oral, BID  Plavix 75 mg oral tablet, 75 mg= 1 tab(s), Oral, qPM  propranolol 10 mg oral tablet, 10 mg= 1 tab(s), Oral, Daily  Proscar 5 mg oral tablet, 5 mg= 1 tab(s), Oral, Daily  simvastatin 10 mg oral tablet, 10 mg= 1 tab(s), Oral, qPM  Singulair 5 mg oral  tablet, CHEWABLE, 10 mg= 2 tab(s), Oral, qPM  Zofran, 4 mg= 2 mL, IV Push, q4hr, PRN  Home  acetaminophen 325 mg oral capsule, See Instructions, PRN  albuterol-ipratropium 3mg-0.5mg/3ml NEB solution  Anoro Ellipta 62.5 mcg-25 mcg/inh inhalation powder, 1 puff(s), INH, Daily  Aricept 10 mg oral tablet, 10 mg= 1 tab(s), Oral, Once a day (at bedtime)  Aspirin Enteric Coated, 81 mg, Oral, Daily  CeraVe topical cream, 1 claudia, TOP, Daily, PRN  Flonase 50 mcg/inh nasal spray, 1 spray(s), Nasal, Daily  glucagon 1 mg injection, 1 mg= 1 EA, IM, q10min, PRN  hydrOXYzine hydrochloride 25 mg oral tablet, 25 mg= 1 tab(s), Oral, QID  levothyroxine 100 mcg (0.1 mg) oral tablet, 100 mcg= 1 tab(s), Oral, Daily  losartan 25 mg oral tablet, 25 mg= 1 tab(s), Oral, Daily  Namenda 5 mg oral tablet, 5 mg= 1 tab(s), Oral, BID  NovoLIN R, Subcutaneous, QIDACHS  NovoLIN R 100 units/mL injectable solution, See Instructions, PRN  omeprazole 20 mg oral DR capsule, 20 mg= 1 cap(s), Oral, Daily  oxcarbazepine 300 mg oral tablet, 300 mg= 1 tab(s), Oral, BID  Plavix 75 mg oral tablet, 75 mg= 1 tab(s), Oral, qPM  propranolol 10 mg oral tablet, 10 mg= 1 tab(s), Oral, Daily  Proscar 5 mg oral tablet, 5 mg= 1 tab(s), Oral, Daily  ranitidine 150 mg oral tablet, 150 mg= 1 tab(s), Oral, BID  simvastatin 10 mg oral tablet, 10 mg= 1 tab(s), Oral, qPM  Singulair 10 mg oral TABLET, 10 mg= 1 tab(s), Oral, qPM  Vistaril 25 mg oral capsule, 25 mg= 1 cap(s), Oral, q6hr, PRN  Allergies  E-Mycin  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